# Patient Record
Sex: MALE | Race: WHITE | Employment: FULL TIME | URBAN - METROPOLITAN AREA
[De-identification: names, ages, dates, MRNs, and addresses within clinical notes are randomized per-mention and may not be internally consistent; named-entity substitution may affect disease eponyms.]

---

## 2017-05-08 ENCOUNTER — HOSPITAL ENCOUNTER (OUTPATIENT)
Dept: LAB | Age: 47
Discharge: HOME | End: 2017-05-08
Payer: SELF-PAY

## 2017-05-08 LAB
ALBUMIN SERPL BCP-MCNC: 4.3 G/DL (ref 3.4–5)
ALBUMIN/GLOB SERPL: 1.2 {RATIO} (ref 0.8–1.7)
ALP SERPL-CCNC: 63 U/L (ref 45–117)
ALT SERPL-CCNC: 43 U/L (ref 16–61)
AST SERPL W P-5'-P-CCNC: 34 U/L (ref 15–37)
BILIRUB DIRECT SERPL-MCNC: <0.1 MG/DL (ref 0–0.2)
BILIRUB SERPL-MCNC: 0.4 MG/DL (ref 0.2–1)
CHOLEST SERPL-MCNC: 330 MG/DL
GLOBULIN SER CALC-MCNC: 3.7 G/DL (ref 2–4)
HDLC SERPL-MCNC: 40 MG/DL (ref 40–60)
HDLC SERPL: 8.3 {RATIO} (ref 0–5)
LDLC SERPL CALC-MCNC: ABNORMAL MG/DL (ref 0–100)
LIPID PROFILE,FLP: ABNORMAL
PROT SERPL-MCNC: 8 G/DL (ref 6.4–8.2)
TRIGL SERPL-MCNC: 1133 MG/DL (ref ?–150)
VLDLC SERPL CALC-MCNC: ABNORMAL MG/DL

## 2017-05-08 PROCEDURE — 80061 LIPID PANEL: CPT | Performed by: INTERNAL MEDICINE

## 2017-05-08 PROCEDURE — 80076 HEPATIC FUNCTION PANEL: CPT | Performed by: INTERNAL MEDICINE

## 2017-05-08 PROCEDURE — 36415 COLL VENOUS BLD VENIPUNCTURE: CPT | Performed by: INTERNAL MEDICINE

## 2017-05-08 RX ORDER — OMEGA-3-ACID ETHYL ESTERS 1 G/1
2 CAPSULE, LIQUID FILLED ORAL 2 TIMES DAILY
COMMUNITY
End: 2017-05-08 | Stop reason: SDUPTHER

## 2017-05-08 NOTE — PROGRESS NOTES
Please contact patient and do the following asap  Add Lovaza 4 g a day. If not covered by insurance, and fish oil capsules 4 a day  Get fasting Lipid profile and LFTs in 6 wks. Please reinforce diet and exercise.

## 2017-05-09 RX ORDER — OMEGA-3-ACID ETHYL ESTERS 1 G/1
2 CAPSULE, LIQUID FILLED ORAL 2 TIMES DAILY
Qty: 60 CAP | Refills: 6 | Status: SHIPPED | OUTPATIENT
Start: 2017-05-09 | End: 2017-05-10

## 2017-05-10 ENCOUNTER — OFFICE VISIT (OUTPATIENT)
Dept: CARDIOLOGY CLINIC | Age: 47
End: 2017-05-10

## 2017-05-10 VITALS
BODY MASS INDEX: 29.51 KG/M2 | DIASTOLIC BLOOD PRESSURE: 88 MMHG | WEIGHT: 188 LBS | SYSTOLIC BLOOD PRESSURE: 115 MMHG | HEIGHT: 67 IN | HEART RATE: 80 BPM

## 2017-05-10 DIAGNOSIS — E78.5 HYPERLIPIDEMIA, UNSPECIFIED HYPERLIPIDEMIA TYPE: ICD-10-CM

## 2017-05-10 DIAGNOSIS — E78.1 PURE HYPERGLYCERIDEMIA: ICD-10-CM

## 2017-05-10 DIAGNOSIS — F10.10 ALCOHOL ABUSE: ICD-10-CM

## 2017-05-10 DIAGNOSIS — I10 ESSENTIAL HYPERTENSION WITH GOAL BLOOD PRESSURE LESS THAN 130/80: ICD-10-CM

## 2017-05-10 DIAGNOSIS — I45.2 RBBB WITH LEFT ANTERIOR FASCICULAR BLOCK: Primary | ICD-10-CM

## 2017-05-10 RX ORDER — LISINOPRIL 40 MG/1
40 TABLET ORAL DAILY
Qty: 90 TAB | Refills: 3 | Status: SHIPPED | OUTPATIENT
Start: 2017-05-10 | End: 2018-05-18 | Stop reason: SDUPTHER

## 2017-05-10 NOTE — PROGRESS NOTES
HISTORY OF PRESENT ILLNESS  Denny Lewis is a 51 y.o. male. HPI Comments: 5/17 d/nkechi fenofibrate dur to rectal odor without incontinence  11/16 thinks that he is getting mild bronchitis for last few days- improving    Hypertension   The history is provided by the medical records. Associated symptoms include shortness of breath. Pertinent negatives include no chest pain and no headaches. Cholesterol Problem   The history is provided by the medical records. Associated symptoms include shortness of breath. Pertinent negatives include no chest pain and no headaches. Shortness of Breath   The history is provided by the patient. This is a new problem. The problem occurs intermittently. The current episode started more than 1 week ago. The problem has not changed since onset. Pertinent negatives include no fever, no headaches, no cough, no wheezing, no PND, no orthopnea, no chest pain, no vomiting, no rash, no leg swelling and no claudication. The problem's precipitants include exercise (working in hot weather; carrying heavy stuff). Review of Systems   Constitutional: Negative for chills, diaphoresis, fever, malaise/fatigue and weight loss. HENT: Negative for nosebleeds. Eyes: Negative for discharge. Respiratory: Positive for shortness of breath. Negative for cough and wheezing. Cardiovascular: Negative for chest pain, palpitations, orthopnea, claudication, leg swelling and PND. Gastrointestinal: Negative for diarrhea, nausea and vomiting. Genitourinary: Negative for dysuria and hematuria. Musculoskeletal: Negative for joint pain. Skin: Negative for rash. Neurological: Negative for dizziness, seizures, loss of consciousness and headaches. Endo/Heme/Allergies: Negative for polydipsia. Does not bruise/bleed easily. Psychiatric/Behavioral: Negative for depression and substance abuse. The patient does not have insomnia.       Allergies   Allergen Reactions    Pcn [Penicillins] Rash Past Medical History:   Diagnosis Date    Essential hypertension with goal blood pressure less than 130/80 7/19/2016    Hyperlipemia 8/1/2016       Family History   Problem Relation Age of Onset    Heart Attack Neg Hx     Stroke Neg Hx        Social History   Substance Use Topics    Smoking status: Former Smoker     Types: Pipe     Quit date: 5/25/2002    Smokeless tobacco: Never Used    Alcohol use No      Comment: quit 8/16        Current Outpatient Prescriptions   Medication Sig    lisinopril (PRINIVIL, ZESTRIL) 40 mg tablet Take 1 Tab by mouth daily. No current facility-administered medications for this visit. History reviewed. No pertinent surgical history. Diagnostic Studies: Old records reviewed and show as follows  EKG tracings reviewed by me today. CARDIOLOGY STUDIES 5/12/2016   EKG Result SR, LAHB/RBBB   6/16 ECHO  SUMMARY:  Left ventricle: Systolic function was normal. Ejection fraction was  estimated in the range of 55 % to 60 %. There were no regional wall motion  abnormalities. Wall thickness was mildly increased. Right ventricle: The ventricle was mildly dilated. Mitral valve: There was trivial regurgitation. Tricuspid valve: Tricuspid regurgitation peak velocity: 2 m/sec. Pulmonary  artery systolic pressure: 19 mmHg. 6/16 TMT  Conclusion:    1. No ischemic ST-T changes at 100% of predicted maximal heart rate.     2. Normal functional capacity.    3. Appropriate heart rate and moderately hypertensive blood pressure response with baseline hypertension.     4. The patient will require medications for hypertension and Lisinopril 10 mg a day was given today.  Follow up BMP.  Side effects were explained including cough.     5. Clinical correlation is suggested    Visit Vitals    /88    Pulse 80    Ht 5' 7\" (1.702 m)    Wt 85.3 kg (188 lb)    BMI 29.44 kg/m2       Mr. Zenia Santiago has a reminder for a \"due or due soon\" health maintenance.  I have asked that he contact his primary care provider for follow-up on this health maintenance. Physical Exam   Constitutional: He is oriented to person, place, and time. He appears well-developed and well-nourished. No distress. HENT:   Head: Normocephalic and atraumatic. Mouth/Throat: Normal dentition. Eyes: Right eye exhibits no discharge. Left eye exhibits no discharge. No scleral icterus. Neck: Neck supple. No JVD present. Carotid bruit is not present. No thyromegaly present. Cardiovascular: Normal rate, regular rhythm, S1 normal, S2 normal, normal heart sounds and intact distal pulses. Exam reveals no gallop and no friction rub. No murmur heard. Pulmonary/Chest: Effort normal and breath sounds normal. He has no wheezes. He has no rales. Abdominal: Soft. He exhibits no mass. There is no tenderness. Musculoskeletal: He exhibits no edema. Lymphadenopathy:        Right cervical: No superficial cervical adenopathy present. Left cervical: No superficial cervical adenopathy present. Neurological: He is alert and oriented to person, place, and time. Skin: Skin is warm and dry. No rash noted. Psychiatric: He has a normal mood and affect. His behavior is normal.       ASSESSMENT and PLAN            Nicolas Sykes was seen today for hypertension and cholesterol problem. Diagnoses and all orders for this visit:    RBBB with left anterior fascicular block  Comments:  6/16 no symptoms      Essential hypertension with goal blood pressure less than 130/80  Comments:  11/16 controlled    Orders:  -     lisinopril (PRINIVIL, ZESTRIL) 40 mg tablet; Take 1 Tab by mouth daily.     Pure hyperglyceridemia  Comments:  5/17 TG 1133; 8/16 [id number]; HDL 39;  retry fenofibrate    Alcohol abuse  Comments:  5/17; 7/16 explained again;  6/16 Patient advised to take alcohol in moderation to avoid future cardiac and liver problems including arrhythmias, cardiomyopathy    Hyperlipidemia, unspecified hyperlipidemia type  - Fenofibrate Nanocrystallized 160 mg tab; Take 160 mg by mouth daily.  -     LIPID PANEL; Future  -     HEPATIC FUNCTION PANEL; Future        Pertinent laboratory and test data reviewed and discussed with patient.   See patient instructions also for other medical advice given    Medications Discontinued During This Encounter   Medication Reason    fenofibrate nanocrystallized 160 mg tab Side Effects    omega-3 acid ethyl esters (LOVAZA) 1 gram capsule Not A Current Medication    lisinopril (PRINIVIL, ZESTRIL) 40 mg tablet Reorder       Follow-up Disposition:  Return in about 6 months (around 11/10/2017), or if symptoms worsen or fail to improve, for post test.

## 2017-05-10 NOTE — PATIENT INSTRUCTIONS
Medications Discontinued During This Encounter   Medication Reason    fenofibrate nanocrystallized 160 mg tab Side Effects    omega-3 acid ethyl esters (LOVAZA) 1 gram capsule Not A Current Medication    lisinopril (PRINIVIL, ZESTRIL) 40 mg tablet Reorder       Orders Placed This Encounter    LIPID PANEL     Standing Status:   Future     Standing Expiration Date:   11/10/2017    HEPATIC FUNCTION PANEL     Standing Status:   Future     Standing Expiration Date:   11/10/2017    lisinopril (PRINIVIL, ZESTRIL) 40 mg tablet     Sig: Take 1 Tab by mouth daily. Dispense:  90 Tab     Refill:  3    Fenofibrate Nanocrystallized 160 mg tab     Sig: Take 160 mg by mouth daily. Dispense:  30 Tab     Refill:  3          Learning About Alcohol Misuse  What is alcohol misuse? Alcohol misuse means drinking so much that it causes problems for you or others. Early problems with alcohol can start at home. You may argue with loved ones about how much you're drinking. Your job may be affected because of drinking. You may drink when it's dangerous or illegal, such as when you drive. Drinking too much for a long time can lead to health conditions like high blood pressure and liver problems. What are the symptoms? Symptoms of alcohol misuse may include:  · Drinking much more than you planned. · Drinking even though it's causing problems for you or others. · Putting yourself in situations where you might get hurt. · Wanting to cut down or stop drinking, but not being able to. · Feeling guilty about how much you're drinking. How is alcohol misuse treated? Getting help for problems with alcohol is up to you. But you don't have to do it alone. There are many people and kinds of treatments to help with alcohol problems. Talking to your doctor is the first step. When you get a doctor's help, treatment for alcohol problems can be safer and quicker. Treatment options can include:  · Treatment programs.  Examples are group therapy, one or more types of counseling, and alcohol education. · Medicines. A doctor or counselor can help you know what kinds of medicines might help with cravings. · Free social support groups. These groups include AA (Alcoholics Anonymous) and SMART (Self-Management and Recovery Training). Your doctor can help you decide which type of program is best for you. Follow-up care is a key part of your treatment and safety. Be sure to make and go to all appointments, and call your doctor if you are having problems. It's also a good idea to know your test results and keep a list of the medicines you take. Where can you learn more? Go to http://baltaBrightFarmskevin.info/. Enter [phone] in the search box to learn more about \"Learning About Alcohol Misuse. \"  Current as of: January 3, 2017  Content Version: 11.2  © 7159-3571 [employer]. Care instructions adapted under license by [employer] (which disclaims liability or warranty for this information). If you have questions about a medical condition or this instruction, always ask your healthcare professional. [address] any warranty or liability for your use of this information.

## 2017-05-10 NOTE — PROGRESS NOTES
1. Have you been to the ER, urgent care clinic since your last visit? Hospitalized since your last visit?    no    2. Have you seen or consulted any other health care providers outside of the [address] [place] [address] since your last visit? Include any pap smears or colon screening. no    3. Since your last visit, have you had any of the following symptoms? Sob with activity palpitations every now then          4. Have you had any blood work, X-rays or cardiac testing? Yes, [facility]             5.  Where do you normally have your labs drawn?   [facility]    6. Do you need any refills today?    yes

## 2017-05-10 NOTE — MR AVS SNAPSHOT
Visit Information Date & Time Provider Department Dept. Phone Encounter #  
 5/10/2017 10:15 AM Austin Lugo MD [facility] [address] [place] [address] [id number] Follow-up Instructions Return in about 6 months (around 11/10/2017), or if symptoms worsen or fail to improve, for post test. Upcoming Health Maintenance Date Due Pneumococcal 19-64 Medium Risk (1 of 1 - PPSV23) 9/4/1988 DTaP/Tdap/Td series (1 - Tdap) 9/4/1990 INFLUENZA AGE 9 TO ADULT 8/1/2017 Allergies as of 5/10/2017  Review Complete On: 5/10/2017 By: Austin Lugo MD  
  
 Severity Noted Reaction Type Reactions Pcn [Penicillins]  07/22/2013   Not Verified Rash Current Immunizations  Never Reviewed No immunizations on file. Not reviewed this visit You Were Diagnosed With   
  
 Codes Comments RBBB with left anterior fascicular block    -  Primary ICD-10-CM: I45.2 ICD-9-CM: 426.52 6/16 no symptoms Essential hypertension with goal blood pressure less than 130/80     ICD-10-CM: I10 
ICD-9-CM: 401.9 11/16 controlled Pure hyperglyceridemia     ICD-10-CM: E78.1 ICD-9-CM: 272.1 5/17 TG 1133; 8/16 [id number]; HDL 39; 
retry fenofibrate Alcohol abuse     ICD-10-CM: F10.10 ICD-9-CM: 305.00 5/17; 7/16 explained again; 
6/16 Patient advised to take alcohol in moderation to avoid future cardiac and liver problems including arrhythmias, cardiomyopathy Hyperlipidemia, unspecified hyperlipidemia type     ICD-10-CM: E78.5 ICD-9-CM: 272.4 Vitals BP Pulse Height(growth percentile) Weight(growth percentile) BMI Smoking Status 115/88 80 5' 7\" (1.702 m) 188 lb (85.3 kg) 29.44 kg/m2 Former Smoker Vitals History BMI and BSA Data Body Mass Index Body Surface Area  
 29.44 kg/m 2 2.01 m 2 Preferred Pharmacy Pharmacy Name Phone [facility] [address], [address] [phone] Your Updated Medication List  
  
 This list is accurate as of: 5/10/17 11:00 AM.  Always use your most recent med list.  
  
  
  
  
 Fenofibrate Nanocrystallized 160 mg Tab Take 160 mg by mouth daily. lisinopril 40 mg tablet Commonly known as:  Tomas Apo Take 1 Tab by mouth daily. Prescriptions Sent to Pharmacy Refills  
 lisinopril (PRINIVIL, ZESTRIL) 40 mg tablet 3 Sig: Take 1 Tab by mouth daily. Class: Normal  
 Pharmacy: [facility] [address], [address] [place] [address] Ph #: [phone] Route: Oral  
 Fenofibrate Nanocrystallized 160 mg tab 3 Sig: Take 160 mg by mouth daily. Class: Normal  
 Pharmacy: [facility] [address], [address] [place] [address] Ph #: [phone] Route: Oral  
  
Follow-up Instructions Return in about 6 months (around 11/10/2017), or if symptoms worsen or fail to improve, for post test.  
  
To-Do List   
 Around 06/14/2017 Lab:  HEPATIC FUNCTION PANEL Around 06/14/2017 Lab:  LIPID PANEL Patient Instructions Medications Discontinued During This Encounter Medication Reason  fenofibrate nanocrystallized 160 mg tab Side Effects  omega-3 acid ethyl esters (LOVAZA) 1 gram capsule Not A Current Medication  lisinopril (PRINIVIL, ZESTRIL) 40 mg tablet Reorder Orders Placed This Encounter  LIPID PANEL Standing Status:   Future Standing Expiration Date:   11/10/2017  
 HEPATIC FUNCTION PANEL Standing Status:   Future Standing Expiration Date:   11/10/2017  lisinopril (PRINIVIL, ZESTRIL) 40 mg tablet Sig: Take 1 Tab by mouth daily. Dispense:  90 Tab Refill:  3  Fenofibrate Nanocrystallized 160 mg tab Sig: Take 160 mg by mouth daily. Dispense:  30 Tab Refill:  3 Learning About Alcohol Misuse What is alcohol misuse? Alcohol misuse means drinking so much that it causes problems for you or others. Early problems with alcohol can start at home. You may argue with loved ones about how much you're drinking. Your job may be affected because of drinking. You may drink when it's dangerous or illegal, such as when you drive. Drinking too much for a long time can lead to health conditions like high blood pressure and liver problems. What are the symptoms? Symptoms of alcohol misuse may include: · Drinking much more than you planned. · Drinking even though it's causing problems for you or others. · Putting yourself in situations where you might get hurt. · Wanting to cut down or stop drinking, but not being able to. · Feeling guilty about how much you're drinking. How is alcohol misuse treated? Getting help for problems with alcohol is up to you. But you don't have to do it alone. There are many people and kinds of treatments to help with alcohol problems. Talking to your doctor is the first step. When you get a doctor's help, treatment for alcohol problems can be safer and quicker. Treatment options can include: · Treatment programs. Examples are group therapy, one or more types of counseling, and alcohol education. · Medicines. A doctor or counselor can help you know what kinds of medicines might help with cravings. · Free social support groups. These groups include AA (Alcoholics Anonymous) and SMART (Self-Management and Recovery Training). Your doctor can help you decide which type of program is best for you. Follow-up care is a key part of your treatment and safety. Be sure to make and go to all appointments, and call your doctor if you are having problems. It's also a good idea to know your test results and keep a list of the medicines you take. Where can you learn more? Go to http://balta-kevin.info/. Enter [phone] in the search box to learn more about \"Learning About Alcohol Misuse. \" Current as of: January 3, 2017 Content Version: 11.2 © 6122-1941 [employer]. Care instructions adapted under license by [employer] (which disclaims liability or warranty for this information). If you have questions about a medical condition or this instruction, always ask your healthcare professional. [address] any warranty or liability for your use of this information. Introducing [facility]! Carol Douglas introduces [employer] patient portal. Now you can access parts of your medical record, email your doctor's office, and request medication refills online. 1. In your internet browser, go to [email or web address] 2. Click on the First Time User? Click Here link in the Sign In box. You will see the New Member Sign Up page. 3. Enter your [employer] [place] Access Code exactly as it appears below. You will not need to use this code after youve completed the sign-up process. If you do not sign up before the expiration date, you must request a new code. · [employer] Access Code: [email or web address] Expires: 8/8/2017 10:05 AM 
 
4. Enter the last four digits of your Social Security Number (xxxx) and Date of Birth (mm/dd/yyyy) as indicated and click Submit. You will be taken to the next sign-up page. 5. Create a [employer] [place] ID. This will be your [employer] login ID and cannot be changed, so think of one that is secure and easy to remember. 6. Create a [employer] [place] password. You can change your password at any time. 7. Enter your Password Reset Question and Answer. This can be used at a later time if you forget your password. 8. Enter your e-mail address. You will receive e-mail notification when new information is available in [address] 9. Click Sign Up. You can now view and download portions of your medical record. 10. Click the Download Summary menu link to download a portable copy of your medical information.  
 
If you have questions, please visit the Frequently Asked Questions section of the [employer]. Remember, Linkedwitht is NOT to be used for urgent needs. For medical emergencies, dial 911. Now available from your iPhone and Android! Please provide this summary of care documentation to your next provider. Your primary care clinician is listed as NONE. If you have any questions after today's visit, please call [phone].

## 2018-03-15 ENCOUNTER — OFFICE VISIT (OUTPATIENT)
Dept: CARDIOLOGY CLINIC | Age: 48
End: 2018-03-15

## 2018-03-15 VITALS
SYSTOLIC BLOOD PRESSURE: 179 MMHG | WEIGHT: 189 LBS | BODY MASS INDEX: 29.66 KG/M2 | HEIGHT: 67 IN | HEART RATE: 59 BPM | DIASTOLIC BLOOD PRESSURE: 99 MMHG

## 2018-03-15 DIAGNOSIS — E78.5 HYPERLIPIDEMIA, UNSPECIFIED HYPERLIPIDEMIA TYPE: ICD-10-CM

## 2018-03-15 DIAGNOSIS — R06.02 SOB (SHORTNESS OF BREATH) ON EXERTION: ICD-10-CM

## 2018-03-15 DIAGNOSIS — I10 ESSENTIAL HYPERTENSION WITH GOAL BLOOD PRESSURE LESS THAN 130/80: ICD-10-CM

## 2018-03-15 DIAGNOSIS — F10.10 ALCOHOL ABUSE: ICD-10-CM

## 2018-03-15 DIAGNOSIS — I45.2 RBBB WITH LEFT ANTERIOR FASCICULAR BLOCK: Primary | ICD-10-CM

## 2018-03-15 DIAGNOSIS — E78.1 PURE HYPERGLYCERIDEMIA: ICD-10-CM

## 2018-03-15 RX ORDER — AMLODIPINE BESYLATE 5 MG/1
5 TABLET ORAL DAILY
Qty: 30 TAB | Refills: 3 | Status: SHIPPED | OUTPATIENT
Start: 2018-03-15 | End: 2018-07-15 | Stop reason: SDUPTHER

## 2018-03-15 NOTE — PATIENT INSTRUCTIONS
After the recommended changes have been made in blood pressure medicines, patient advised to keep BP/HR(pulse rate) chart twice daily and bring us results in next 2 weeks or so. Patient may send the results via \"My Chart\" if desired. Please rest for 5-10 minutes before checking blood pressure  Medications Discontinued During This Encounter   Medication Reason    Fenofibrate Nanocrystallized 160 mg tab Reorder       Orders Placed This Encounter    LIPID PANEL     Standing Status:   Future     Standing Expiration Date:   9/15/2018    HEPATIC FUNCTION PANEL     Standing Status:   Future     Standing Expiration Date:   9/15/2018    AMB POC EKG ROUTINE W/ 12 LEADS, INTER & REP     Order Specific Question:   Reason for Exam:     Answer:   routine testing    amLODIPine (NORVASC) 5 mg tablet     Sig: Take 1 Tab by mouth daily. Dispense:  30 Tab     Refill:  3    Fenofibrate Nanocrystallized 160 mg tab     Sig: Take 160 mg by mouth daily. Dispense:  30 Tab     Refill:  3          Learning About Alcohol Misuse  What is alcohol misuse? Alcohol misuse means drinking so much that it causes problems for you or others. Early problems with alcohol can start at home. You may argue with loved ones about how much you're drinking. Your job may be affected because of drinking. You may drink when it's dangerous or illegal, such as when you drive. Drinking too much for a long time can lead to health conditions like high blood pressure and liver problems. What are the symptoms? Symptoms of alcohol misuse may include:  · Drinking much more than you planned. · Drinking even though it's causing problems for you or others. · Putting yourself in situations where you might get hurt. · Wanting to cut down or stop drinking, but not being able to. · Feeling guilty about how much you're drinking. How is alcohol misuse treated? Getting help for problems with alcohol is up to you. But you don't have to do it alone.  There are many people and kinds of treatments to help with alcohol problems. Talking to your doctor is the first step. When you get a doctor's help, treatment for alcohol problems can be safer and quicker. Treatment options can include:  · Treatment programs. Examples are group therapy, one or more types of counseling, and alcohol education. · Medicines. A doctor or counselor can help you know what kinds of medicines might help with cravings. · Free social support groups. These groups include AA (Alcoholics Anonymous) and SMART (Self-Management and Recovery Training). Your doctor can help you decide which type of program is best for you. Follow-up care is a key part of your treatment and safety. Be sure to make and go to all appointments, and call your doctor if you are having problems. It's also a good idea to know your test results and keep a list of the medicines you take. Where can you learn more? Go to http://balta-kevin.info/. Enter [id number] in the search box to learn more about \"Learning About Alcohol Misuse. \"  Current as of: November 3, 2016  Content Version: 11.4  © 0232-4199 [employer]. Care instructions adapted under license by [employer] (which disclaims liability or warranty for this information). If you have questions about a medical condition or this instruction, always ask your healthcare professional. [address] any warranty or liability for your use of this information.

## 2018-03-15 NOTE — MR AVS SNAPSHOT
[address] [facility] 
 
 
 [address] [facility], [address] [facility] [address] 
[phone] Patient: Joyce Maxwell MRN: [id number] EHO:9/6/1833 Visit Information Date & Time Provider Department Dept. Phone Encounter #  
 3/15/2018  8:45 AM Vik Benitez MD [facility] [address] [place] [address] [id number] Follow-up Instructions Return in about 6 months (around 9/15/2018), or if symptoms worsen or fail to improve, for post test.  
  
Your Appointments 9/14/2018  1:15 PM  
Office Visit with Vik Benitez MD  
[facility]) Appt Note: 6 months post labs [address] [facility], [address] [facility] [address]  
[address] [facility], [address] [place] [address] Upcoming Health Maintenance Date Due Pneumococcal 19-64 Medium Risk (1 of 1 - PPSV23) 9/4/1988 DTaP/Tdap/Td series (1 - Tdap) 9/4/1990 Influenza Age 5 to Adult 8/1/2017 Allergies as of 3/15/2018  Review Complete On: 3/15/2018 By: Vik Benitez MD  
  
 Severity Noted Reaction Type Reactions Pcn [Penicillins]  07/22/2013   Not Verified Rash Current Immunizations  Never Reviewed No immunizations on file. Not reviewed this visit You Were Diagnosed With   
  
 Codes Comments RBBB with left anterior fascicular block    -  Primary ICD-10-CM: I45.2 ICD-9-CM: 426.52 3/18; 6/16 no symptoms Hyperlipidemia, unspecified hyperlipidemia type     ICD-10-CM: E78.5 ICD-9-CM: 272.4 Essential hypertension with goal blood pressure less than 130/80     ICD-10-CM: I10 
ICD-9-CM: 401.9 3/18 add norvasc; 11/16 controlled Pure hyperglyceridemia     ICD-10-CM: E78.1 ICD-9-CM: 272.1 3/18 restart fenofibrate; 5/17 TG 1133; 8/16 [id number]; HDL 39;  
  
 Alcohol abuse     ICD-10-CM: F10.10 ICD-9-CM: 305.00 3/18; 5/17; 7/16 explained again; 
6/16 Patient advised to take alcohol in moderation to avoid future cardiac and liver problems including arrhythmias, cardiomy SOB (shortness of breath) on exertion     ICD-10-CM: R06.02 
ICD-9-CM: 786.05 3/18 improving 
nl TMT Vitals BP Pulse Height(growth percentile) Weight(growth percentile) BMI Smoking Status (!) 179/99 (!) 59 5' 7\" (1.702 m) 189 lb (85.7 kg) 29.6 kg/m2 Former Smoker Vitals History BMI and BSA Data Body Mass Index Body Surface Area  
 29.6 kg/m 2 2.01 m 2 Preferred Pharmacy Pharmacy Name Phone [facility] [address], [address] [place], [place] [address] Road [phone] Your Updated Medication List  
  
   
This list is accurate as of 3/15/18  9:22 AM.  Always use your most recent med list. amLODIPine 5 mg tablet Commonly known as:  Amaya Whittington Take 1 Tab by mouth daily. Fenofibrate Nanocrystallized 160 mg Tab Take 160 mg by mouth daily. lisinopril 40 mg tablet Commonly known as:  Елена Mccarthy Take 1 Tab by mouth daily. Prescriptions Sent to Pharmacy Refills  
 amLODIPine (NORVASC) 5 mg tablet 3 Sig: Take 1 Tab by mouth daily. Class: Normal  
 Pharmacy: [facility] [address] [place] [address], [address] [place] [address] Road Ph #: [phone] Route: Oral  
 Fenofibrate Nanocrystallized 160 mg tab 3 Sig: Take 160 mg by mouth daily. Class: Normal  
 Pharmacy: [facility] [address], [address] [place] [address] Road Ph #: [phone] Route: Oral  
  
We Performed the Following AMB POC EKG ROUTINE W/ 12 LEADS, INTER & REP [49771 CPT(R)] Follow-up Instructions Return in about 6 months (around 9/15/2018), or if symptoms worsen or fail to improve, for post test.  
  
To-Do List   
 Around 04/19/2018 Lab:  HEPATIC FUNCTION PANEL Around 04/19/2018 Lab:  LIPID PANEL Patient Instructions After the recommended changes have been made in blood pressure medicines, patient advised to keep BP/HR(pulse rate) chart twice daily and bring us results in next 2 weeks or so. Patient may send the results via \"My Chart\" if desired. Please rest for 5-10 minutes before checking blood pressure Medications Discontinued During This Encounter Medication Reason  Fenofibrate Nanocrystallized 160 mg tab Reorder Orders Placed This Encounter  LIPID PANEL Standing Status:   Future Standing Expiration Date:   9/15/2018  
 HEPATIC FUNCTION PANEL Standing Status:   Future Standing Expiration Date:   9/15/2018  AMB POC EKG ROUTINE W/ 12 LEADS, INTER & REP Order Specific Question:   Reason for Exam: Answer:   routine testing  amLODIPine (NORVASC) 5 mg tablet Sig: Take 1 Tab by mouth daily. Dispense:  30 Tab Refill:  3  Fenofibrate Nanocrystallized 160 mg tab Sig: Take 160 mg by mouth daily. Dispense:  30 Tab Refill:  3 Learning About Alcohol Misuse What is alcohol misuse? Alcohol misuse means drinking so much that it causes problems for you or others. Early problems with alcohol can start at home. You may argue with loved ones about how much you're drinking. Your job may be affected because of drinking. You may drink when it's dangerous or illegal, such as when you drive. Drinking too much for a long time can lead to health conditions like high blood pressure and liver problems. What are the symptoms? Symptoms of alcohol misuse may include: · Drinking much more than you planned. · Drinking even though it's causing problems for you or others. · Putting yourself in situations where you might get hurt. · Wanting to cut down or stop drinking, but not being able to. · Feeling guilty about how much you're drinking. How is alcohol misuse treated? Getting help for problems with alcohol is up to you. But you don't have to do it alone. There are many people and kinds of treatments to help with alcohol problems. Talking to your doctor is the first step. When you get a doctor's help, treatment for alcohol problems can be safer and quicker. Treatment options can include: · Treatment programs. Examples are group therapy, one or more types of counseling, and alcohol education. · Medicines. A doctor or counselor can help you know what kinds of medicines might help with cravings. · Free social support groups. These groups include AA (Alcoholics Anonymous) and SMART (Self-Management and Recovery Training). Your doctor can help you decide which type of program is best for you. Follow-up care is a key part of your treatment and safety. Be sure to make and go to all appointments, and call your doctor if you are having problems. It's also a good idea to know your test results and keep a list of the medicines you take. Where can you learn more? Go to http://balta-kevin.info/. Enter [phone] in the search box to learn more about \"Learning About Alcohol Misuse. \" Current as of: November 3, 2016 Content Version: 11.4 © 6959-0360 Healthwise, Incorporated. Care instructions adapted under license by [employer] (which disclaims liability or warranty for this information). If you have questions about a medical condition or this instruction, always ask your healthcare professional. [address] any warranty or liability for your use of this information. Introducing [facility]! Bree Godwin introduces [employer] patient portal. Now you can access parts of your medical record, email your doctor's office, and request medication refills online. 1. In your internet browser, go to [email or web address] 2. Click on the First Time User? Click Here link in the Sign In box. You will see the New Member Sign Up page. 3. Enter your [employer] Access Code exactly as it appears below. You will not need to use this code after youve completed the sign-up process.  If you do not sign up before the expiration date, you must request a new code. · Slingr Access Code: [email or web address] Expires: 6/13/2018  8:42 AM 
 
4. Enter the last four digits of your Social Security Number (xxxx) and Date of Birth (mm/dd/yyyy) as indicated and click Submit. You will be taken to the next sign-up page. 5. Create a Slingr ID. This will be your Slingr login ID and cannot be changed, so think of one that is secure and easy to remember. 6. Create a Slingr password. You can change your password at any time. 7. Enter your Password Reset Question and Answer. This can be used at a later time if you forget your password. 8. Enter your e-mail address. You will receive e-mail notification when new information is available in [address] 9. Click Sign Up. You can now view and download portions of your medical record. 10. Click the Download Summary menu link to download a portable copy of your medical information. If you have questions, please visit the Frequently Asked Questions section of the [employer] website. Remember, [employer]r is NOT to be used for urgent needs. For medical emergencies, dial 911. Now available from your iPhone and Android! Please provide this summary of care documentation to your next provider. Your primary care clinician is listed as NONE. If you have any questions after today's visit, please call [phone].

## 2018-03-15 NOTE — PROGRESS NOTES
HISTORY OF PRESENT ILLNESS  Warren Figueredo is a 49 y.o. male. HPI Comments: 5/17 d/nkechi fenofibrate dur to rectal odor without incontinence  11/16 thinks that he is getting mild bronchitis for last few days- improving    Cholesterol Problem   The history is provided by the medical records. Associated symptoms include shortness of breath. Pertinent negatives include no chest pain and no headaches. Hypertension   The history is provided by the medical records. Associated symptoms include shortness of breath. Pertinent negatives include no chest pain and no headaches. Shortness of Breath   The history is provided by the patient. This is a new problem. The problem occurs intermittently. The current episode started more than 1 week ago. The problem has been gradually improving. Pertinent negatives include no fever, no headaches, no cough, no wheezing, no PND, no orthopnea, no chest pain, no vomiting, no rash, no leg swelling and no claudication. The problem's precipitants include exercise (working in hot weather; carrying heavy stuff). Review of Systems   Constitutional: Negative for chills, diaphoresis, fever, malaise/fatigue and weight loss. HENT: Negative for nosebleeds. Eyes: Negative for discharge. Respiratory: Positive for shortness of breath. Negative for cough and wheezing. Cardiovascular: Negative for chest pain, palpitations, orthopnea, claudication, leg swelling and PND. Gastrointestinal: Negative for diarrhea, nausea and vomiting. Genitourinary: Negative for dysuria and hematuria. Musculoskeletal: Negative for joint pain. Skin: Negative for rash. Neurological: Negative for dizziness, seizures, loss of consciousness and headaches. Endo/Heme/Allergies: Negative for polydipsia. Does not bruise/bleed easily. Psychiatric/Behavioral: Negative for depression and substance abuse. The patient does not have insomnia.       Allergies   Allergen Reactions    Pcn [Penicillins] Rash Past Medical History:   Diagnosis Date    Essential hypertension with goal blood pressure less than 130/80 7/19/2016    Hyperlipemia 8/1/2016       Family History   Problem Relation Age of Onset    Heart Attack Neg Hx     Stroke Neg Hx        Social History   Substance Use Topics    Smoking status: Former Smoker     Types: Pipe     Quit date: 5/25/2002    Smokeless tobacco: Never Used    Alcohol use 46.8 oz/week     36 Standard drinks or equivalent, 28 Shots of liquor, 14 Cans of beer per week      Comment: moderate use 2 beers per day and 4 mixed drinks         Current Outpatient Prescriptions   Medication Sig    lisinopril (PRINIVIL, ZESTRIL) 40 mg tablet Take 1 Tab by mouth daily.  Fenofibrate Nanocrystallized 160 mg tab Take 160 mg by mouth daily. (Patient not taking: Reported on 3/15/2018)     No current facility-administered medications for this visit. History reviewed. No pertinent surgical history. Diagnostic Studies: Old records reviewed and show as follows  EKG tracings reviewed by me today. CARDIOLOGY STUDIES 5/12/2016   EKG Result SR, LAHB/RBBB   Some recent data might be hidden   6/16 ECHO  SUMMARY:  Left ventricle: Systolic function was normal. Ejection fraction was  estimated in the range of 55 % to 60 %. There were no regional wall motion  abnormalities. Wall thickness was mildly increased. Right ventricle: The ventricle was mildly dilated. Mitral valve: There was trivial regurgitation. Tricuspid valve: Tricuspid regurgitation peak velocity: 2 m/sec. Pulmonary  artery systolic pressure: 19 mmHg. 6/16 TMT  Conclusion:    1. No ischemic ST-T changes at 100% of predicted maximal heart rate.     2. Normal functional capacity.    3. Appropriate heart rate and moderately hypertensive blood pressure response with baseline hypertension.     4.  The patient will require medications for hypertension and Lisinopril 10 mg a day was given today.  Follow up BMP.  Side effects were explained including cough.     5. Clinical correlation is suggested    Visit Vitals    BP (!) 179/99    Pulse (!) 59    Ht 5' 7\" (1.702 m)    Wt 189 lb (85.7 kg)    BMI 29.6 kg/m2       Mr. Carmen Blackburn has a reminder for a \"due or due soon\" health maintenance. I have asked that he contact his primary care provider for follow-up on this health maintenance. Physical Exam   Constitutional: He is oriented to person, place, and time. He appears well-developed and well-nourished. No distress. HENT:   Head: Normocephalic and atraumatic. Mouth/Throat: Normal dentition. Eyes: Right eye exhibits no discharge. Left eye exhibits no discharge. No scleral icterus. Neck: Neck supple. No JVD present. Carotid bruit is not present. No thyromegaly present. Cardiovascular: Normal rate, regular rhythm, S1 normal, S2 normal, normal heart sounds and intact distal pulses. Exam reveals no gallop and no friction rub. No murmur heard. Pulmonary/Chest: Effort normal and breath sounds normal. He has no wheezes. He has no rales. Abdominal: Soft. He exhibits no mass. There is no tenderness. Musculoskeletal: He exhibits no edema. Lymphadenopathy:        Right cervical: No superficial cervical adenopathy present. Left cervical: No superficial cervical adenopathy present. Neurological: He is alert and oriented to person, place, and time. Skin: Skin is warm and dry. No rash noted. Psychiatric: He has a normal mood and affect. His behavior is normal.       ASSESSMENT and PLAN          Diagnoses and all orders for this visit:    1. RBBB with left anterior fascicular block  Comments:  3/18; 6/16 no symptoms    Orders:  -     AMB POC EKG ROUTINE W/ 12 LEADS, INTER & REP    2. Hyperlipidemia, unspecified hyperlipidemia type  -     Fenofibrate Nanocrystallized 160 mg tab; Take 160 mg by mouth daily.     3. Essential hypertension with goal blood pressure less than 130/80  Comments:  3/18 add norvasc; 11/16 controlled    Orders:  -     amLODIPine (NORVASC) 5 mg tablet; Take 1 Tab by mouth daily. 4. Pure hyperglyceridemia  Comments:  3/18 restart fenofibrate; 5/17 TG 1133; 8/16 [id number]; HDL 39;     Orders:  -     LIPID PANEL; Future  -     HEPATIC FUNCTION PANEL; Future    5. Alcohol abuse  Comments:  3/18; 5/17; 7/16 explained again;  6/16 Patient advised to take alcohol in moderation to avoid future cardiac and liver problems including arrhythmias, cardiomy    6. SOB (shortness of breath) on exertion  Comments:  3/18 improving  nl TMT        Pertinent laboratory and test data reviewed and discussed with patient.   See patient instructions also for other medical advice given    Medications Discontinued During This Encounter   Medication Reason    Fenofibrate Nanocrystallized 160 mg tab Reorder       Follow-up Disposition:  Return in about 6 months (around 9/15/2018), or if symptoms worsen or fail to improve, for post test.

## 2018-03-15 NOTE — PROGRESS NOTES
1. Have you been to the ER, urgent care clinic since your last visit? Hospitalized since your last visit? No     2. Have you seen or consulted any other health care providers outside of the [facility]\"" since your last visit? Include any pap smears or colon screening. no     3. Since your last visit, have you had any of the following symptoms? No symptoms reported by patient    4. Have you had any blood work, X-rays or cardiac testing?     None     Discussed meds verbally with patient

## 2018-05-18 DIAGNOSIS — I10 ESSENTIAL HYPERTENSION WITH GOAL BLOOD PRESSURE LESS THAN 130/80: ICD-10-CM

## 2018-05-18 RX ORDER — LISINOPRIL 40 MG/1
TABLET ORAL
Qty: 45 TAB | Refills: 2 | Status: SHIPPED | OUTPATIENT
Start: 2018-05-18 | End: 2018-10-01 | Stop reason: SDUPTHER

## 2018-07-15 DIAGNOSIS — E78.5 HYPERLIPIDEMIA, UNSPECIFIED HYPERLIPIDEMIA TYPE: ICD-10-CM

## 2018-07-15 DIAGNOSIS — I10 ESSENTIAL HYPERTENSION WITH GOAL BLOOD PRESSURE LESS THAN 130/80: ICD-10-CM

## 2018-07-16 RX ORDER — AMLODIPINE BESYLATE 5 MG/1
TABLET ORAL
Qty: 30 TAB | Refills: 2 | Status: SHIPPED | OUTPATIENT
Start: 2018-07-16 | End: 2018-10-17 | Stop reason: SDUPTHER

## 2018-07-16 RX ORDER — FENOFIBRATE 160 MG/1
TABLET ORAL
Qty: 30 TAB | Refills: 2 | Status: SHIPPED | OUTPATIENT
Start: 2018-07-16 | End: 2018-10-17 | Stop reason: SDUPTHER

## 2018-10-01 DIAGNOSIS — I10 ESSENTIAL HYPERTENSION WITH GOAL BLOOD PRESSURE LESS THAN 130/80: ICD-10-CM

## 2018-10-01 RX ORDER — LISINOPRIL 40 MG/1
TABLET ORAL
Qty: 45 TAB | Refills: 1 | Status: SHIPPED | OUTPATIENT
Start: 2018-10-01 | End: 2018-12-20 | Stop reason: SDUPTHER

## 2018-10-12 ENCOUNTER — OFFICE VISIT (OUTPATIENT)
Dept: CARDIOLOGY CLINIC | Age: 48
End: 2018-10-12

## 2018-10-12 VITALS
DIASTOLIC BLOOD PRESSURE: 78 MMHG | BODY MASS INDEX: 29.35 KG/M2 | WEIGHT: 187 LBS | HEART RATE: 67 BPM | HEIGHT: 67 IN | SYSTOLIC BLOOD PRESSURE: 125 MMHG

## 2018-10-12 DIAGNOSIS — E78.00 PURE HYPERCHOLESTEROLEMIA: ICD-10-CM

## 2018-10-12 DIAGNOSIS — I10 ESSENTIAL HYPERTENSION WITH GOAL BLOOD PRESSURE LESS THAN 130/80: ICD-10-CM

## 2018-10-12 DIAGNOSIS — I45.2 RBBB WITH LEFT ANTERIOR FASCICULAR BLOCK: Primary | ICD-10-CM

## 2018-10-12 DIAGNOSIS — F10.10 ALCOHOL ABUSE: ICD-10-CM

## 2018-10-12 NOTE — MR AVS SNAPSHOT
[address] [facility] [place] 
 
 
 [address] [facility], [address] [facility] [address] 
[phone] Patient: Shantelle Quiroz MRN: [id number] XIT:[id number]/[id number] Visit Information Date & Time Provider Department Dept. Phone Encounter #  
 10/12/2018  8:00 AM Yovani Willson MD [facility] [address] [place] [address] [id number] Follow-up Instructions Return in about 6 months (around 4/12/2019), or if symptoms worsen or fail to improve. Your Appointments 4/11/2019  8:15 AM  
Office Visit with Yovani Willson MD  
[facility]) Appt Note: 6 months post BMP,Lipid, [facility], [address] [facility] [address]  
[address] [facility], [address] Upcoming Health Maintenance Date Due Pneumococcal 19-64 Medium Risk (1 of 1 - PPSV23) 9/4/1988 DTaP/Tdap/Td series (1 - Tdap) 9/4/1990 Influenza Age 5 to Adult 8/1/2018 Allergies as of 10/12/2018  Review Complete On: 10/12/2018 By: Armando Faulkner NP Severity Noted Reaction Type Reactions Pcn [Penicillins]  07/22/2013   Not Verified Rash Current Immunizations  Never Reviewed No immunizations on file. Not reviewed this visit You Were Diagnosed With   
  
 Codes Comments RBBB with left anterior fascicular block    -  Primary ICD-10-CM: I45.2 ICD-9-CM: 426.52 3/18; 6/16 no symptoms Essential hypertension with goal blood pressure less than 130/80     ICD-10-CM: I10 
ICD-9-CM: 401.9 10/18 controlled/3/18 add norvasc; 11/16 controlled Pure hypercholesterolemia     ICD-10-CM: E78.00 ICD-9-CM: 272.0 Lipds ordered. 3/18 restart fenofibrate; 5/17 TG 1133; 8/16 [id number]; HDL 3 Alcohol abuse     ICD-10-CM: F10.10 ICD-9-CM: 305.00 3/18; 5/17; 7/16 explained again; 
6/16 Patient advised to take alcohol in moderation to avoid future cardiac and liver problems including arrhythmias, cardiomy Vitals BP Pulse Height(growth percentile) Weight(growth percentile) BMI Smoking Status 125/78 67 5' 7\" (1.702 m) 187 lb (84.8 kg) 29.29 kg/m2 Former Smoker Vitals History BMI and BSA Data Body Mass Index Body Surface Area  
 29.29 kg/m 2 2 m 2 Preferred Pharmacy Pharmacy Name Phone [facility], [address] [phone] Your Updated Medication List  
  
   
This list is accurate as of 10/12/18  8:52 AM.  Always use your most recent med list. amLODIPine 5 mg tablet Commonly known as:  [place] TAKE ONE TABLET BY MOUTH DAILY  
  
 fenofibrate 160 mg tablet Commonly known as:  LOFIBRA TAKE ONE TABLET BY MOUTH DAILY  
  
 lisinopril 40 mg tablet Commonly known as:  PRINIVIL, ZESTRIL  
TAKE ONE TABLET BY MOUTH DAILY Follow-up Instructions Return in about 6 months (around 4/12/2019), or if symptoms worsen or fail to improve. To-Do List   
 10/12/2018 Lab:  LIPID PANEL   
  
 10/17/2018 Lab:  HEPATIC FUNCTION PANEL   
  
 10/18/2018 Lab:  METABOLIC PANEL, COMPREHENSIVE Patient Instructions Have fasting blood work drawn - will call with results Continue current medications Low sodium diet Heart-Healthy Diet: Care Instructions Your Care Instructions A heart-healthy diet has lots of vegetables, fruits, nuts, beans, and whole grains, and is low in salt. It limits foods that are high in saturated fat, such as meats, cheeses, and fried foods. It may be hard to change your diet, but even small changes can lower your risk of heart attack and heart disease. Follow-up care is a key part of your treatment and safety. Be sure to make and go to all appointments, and call your doctor if you are having problems. It's also a good idea to know your test results and keep a list of the medicines you take. How can you care for yourself at home? Watch your portions · Learn what a serving is. A \"serving\" and a \"portion\" are not always the same thing. Make sure that you are not eating larger portions than are recommended. For example, a serving of pasta is ½ cup. A serving size of meat is 2 to 3 ounces. A 3-ounce serving is about the size of a deck of cards. Measure serving sizes until you are good at [place]" them. Keep in mind that restaurants often serve portions that are 2 or 3 times the size of one serving. · To keep your energy level up and keep you from feeling hungry, eat often but in smaller portions. · Eat only the number of calories you need to stay at a healthy weight. If you need to lose weight, eat fewer calories than your body burns (through exercise and other physical activity). Eat more fruits and vegetables · Eat a variety of fruit and vegetables every day. Dark green, deep orange, red, or yellow fruits and vegetables are especially good for you. Examples include spinach, carrots, peaches, and berries. · Keep carrots, celery, and other veggies handy for snacks. Buy fruit that is in season and store it where you can see it so that you will be tempted to eat it. · Cook dishes that have a lot of veggies in them, such as stir-fries and soups. Limit saturated and trans fat · Read food labels, and try to avoid saturated and trans fats. They increase your risk of heart disease. Trans fat is found in many processed foods such as cookies and crackers. · Use olive or canola oil when you cook. Try cholesterol-lowering spreads, such as Benecol or Take Control. · Bake, broil, grill, or steam foods instead of frying them. · Choose lean meats instead of high-fat meats such as hot dogs and sausages. Cut off all visible fat when you prepare meat. · Eat fish, skinless poultry, and meat alternatives such as soy products instead of high-fat meats. Soy products, such as tofu, may be especially good for your heart. · Choose low-fat or fat-free milk and dairy products. Eat fish · Eat at least two servings of fish a week. Certain fish, such as salmon and tuna, contain omega-3 fatty acids, which may help reduce your risk of heart attack. Eat foods high in fiber · Eat a variety of grain products every day. Include whole-grain foods that have lots of fiber and nutrients. Examples of whole-grain foods include oats, whole wheat bread, and brown rice. · Buy whole-grain breads and cereals, instead of white bread or pastries. Limit salt and sodium · Limit how much salt and sodium you eat to help lower your blood pressure. · Taste food before you salt it. Add only a little salt when you think you need it. With time, your taste buds will adjust to less salt. · Eat fewer snack items, fast foods, and other high-salt, processed foods. Check food labels for the amount of sodium in packaged foods. · Choose low-sodium versions of canned goods (such as soups, vegetables, and beans). Limit sugar · Limit drinks and foods with added sugar. These include candy, desserts, and soda pop. Limit alcohol · Limit alcohol to no more than 2 drinks a day for men and 1 drink a day for women. Too much alcohol can cause health problems. When should you call for help? Watch closely for changes in your health, and be sure to contact your doctor if: 
  · You would like help planning heart-healthy meals. Where can you learn more? Go to http://balta-kevin.info/. Enter V137 in the search box to learn more about \"Heart-Healthy Diet: Care Instructions. \" Current as of: December 6, 2017 Content Version: 11.8 © 2249-2248 [employer]. Care instructions adapted under license by [employer] (which disclaims liability or warranty for this information).  If you have questions about a medical condition or this instruction, always ask your healthcare professional. [employer] disclaims any warranty or liability for your use of this information. Introducing [facility] & HEALTH SERVICES! [employer] introduces DivvyHQ patient portal. Now you can access parts of your medical record, email your doctor's office, and request medication refills online. 1. In your internet browser, go to [email or web address] 2. Click on the First Time User? Click Here link in the Sign In box. You will see the New Member Sign Up page. 3. Enter your DivvyHQ Access Code exactly as it appears below. You will not need to use this code after youve completed the sign-up process. If you do not sign up before the expiration date, you must request a new code. · DivvyHQ Access Code: [email or web address] Expires: 1/10/2019  7:47 AM 
 
4. Enter the last four digits of your Social Security Number (xxxx) and Date of Birth (mm/dd/yyyy) as indicated and click Submit. You will be taken to the next sign-up page. 5. Create a DivvyHQ ID. This will be your DivvyHQ login ID and cannot be changed, so think of one that is secure and easy to remember. 6. Create a DivvyHQ password. You can change your password at any time. 7. Enter your Password Reset Question and Answer. This can be used at a later time if you forget your password. 8. Enter your e-mail address. You will receive e-mail notification when new information is available in [address] 9. Click Sign Up. You can now view and download portions of your medical record. 10. Click the Download Summary menu link to download a portable copy of your medical information. If you have questions, please visit the Frequently Asked Questions section of the DivvyHQ website. Remember, DivvyHQ is NOT to be used for urgent needs. For medical emergencies, dial 911. Now available from your iPhone and Android! Please provide this summary of care documentation to your next provider. Your primary care clinician is listed as NONE. If you have any questions after today's visit, please call [phone].

## 2018-10-12 NOTE — PROGRESS NOTES
HISTORY OF PRESENT ILLNESS  Shantelle Quiroz is a 51 y.o. male. HPI Comments: 5/17 d/nkechi fenofibrate dur to rectal odor without incontinence  11/16 thinks that he is getting mild bronchitis for last few days- improving  10/18 - Doing well, no complaints at this time. Denies chest pain, sob, orthopnea, edema, palpitations or dizziness. Hypertension   The history is provided by the medical records. Associated symptoms include shortness of breath. Pertinent negatives include no chest pain and no headaches. Cholesterol Problem   The history is provided by the medical records. Associated symptoms include shortness of breath. Pertinent negatives include no chest pain and no headaches. Shortness of Breath   The history is provided by the patient. This is a new problem. The problem occurs intermittently. The current episode started more than 1 week ago. The problem has been gradually improving. Pertinent negatives include no fever, no headaches, no cough, no wheezing, no PND, no orthopnea, no chest pain, no vomiting, no rash, no leg swelling and no claudication. The problem's precipitants include exercise (working in hot weather; carrying heavy stuff). Review of Systems   Constitutional: Negative for chills, diaphoresis, fever, malaise/fatigue and weight loss. HENT: Negative for nosebleeds. Eyes: Negative for discharge. Respiratory: Positive for shortness of breath. Negative for cough and wheezing. Cardiovascular: Negative for chest pain, palpitations, orthopnea, claudication, leg swelling and PND. Gastrointestinal: Negative for diarrhea, nausea and vomiting. Genitourinary: Negative for dysuria and hematuria. Musculoskeletal: Negative for joint pain. Skin: Negative for rash. Neurological: Negative for dizziness, seizures, loss of consciousness and headaches. Endo/Heme/Allergies: Negative for polydipsia. Does not bruise/bleed easily.    Psychiatric/Behavioral: Negative for depression and substance abuse. The patient does not have insomnia. Allergies   Allergen Reactions    Pcn [Penicillins] Rash       Past Medical History:   Diagnosis Date    Essential hypertension with goal blood pressure less than 130/80 7/19/2016    Hyperlipemia 8/1/2016       Family History   Problem Relation Age of Onset    Heart Attack Neg Hx     Stroke Neg Hx        Social History   Substance Use Topics    Smoking status: Former Smoker     Types: Pipe     Quit date: 5/25/2002    Smokeless tobacco: Never Used    Alcohol use 46.8 oz/week     36 Standard drinks or equivalent, 28 Shots of liquor, 14 Cans of beer per week      Comment: moderate use 2 beers per day and 4 mixed drinks         Current Outpatient Prescriptions   Medication Sig    lisinopril (PRINIVIL, ZESTRIL) 40 mg tablet TAKE ONE TABLET BY MOUTH DAILY    amLODIPine (NORVASC) 5 mg tablet TAKE ONE TABLET BY MOUTH DAILY    fenofibrate (LOFIBRA) 160 mg tablet TAKE ONE TABLET BY MOUTH DAILY     No current facility-administered medications for this visit. No past surgical history on file. Diagnostic Studies: Old records reviewed and show as follows  EKG tracings reviewed by me today. CARDIOLOGY STUDIES 5/12/2016   EKG Result SR, LAHB/RBBB   Some recent data might be hidden   6/16 ECHO  SUMMARY:  Left ventricle: Systolic function was normal. Ejection fraction was  estimated in the range of 55 % to 60 %. There were no regional wall motion  abnormalities. Wall thickness was mildly increased. Right ventricle: The ventricle was mildly dilated. Mitral valve: There was trivial regurgitation. Tricuspid valve: Tricuspid regurgitation peak velocity: 2 m/sec. Pulmonary  artery systolic pressure: 19 mmHg. 6/16 TMT  Conclusion:    1. No ischemic ST-T changes at 100% of predicted maximal heart rate.     2. Normal functional capacity.    3.  Appropriate heart rate and moderately hypertensive blood pressure response with baseline hypertension.     4. The patient will require medications for hypertension and Lisinopril 10 mg a day was given today.  Follow up BMP.  Side effects were explained including cough.     5. Clinical correlation is suggested    Visit Vitals    /78    Pulse 67    Ht 5' 7\" (1.702 m)    Wt 84.8 kg (187 lb)    BMI 29.29 kg/m2       Mr. Modesta Alexis has a reminder for a \"due or due soon\" health maintenance. I have asked that he contact his primary care provider for follow-up on this health maintenance. Physical Exam   Constitutional: He is oriented to person, place, and time. He appears well-developed and well-nourished. No distress. HENT:   Head: Normocephalic and atraumatic. Mouth/Throat: Normal dentition. Eyes: Right eye exhibits no discharge. Left eye exhibits no discharge. No scleral icterus. Neck: Neck supple. No JVD present. Carotid bruit is not present. No thyromegaly present. Cardiovascular: Normal rate, regular rhythm, S1 normal, S2 normal, normal heart sounds and intact distal pulses. Exam reveals no gallop and no friction rub. No murmur heard. Pulmonary/Chest: Effort normal and breath sounds normal. He has no wheezes. He has no rales. Abdominal: Soft. He exhibits no mass. There is no tenderness. Musculoskeletal: He exhibits no edema. Lymphadenopathy:        Right cervical: No superficial cervical adenopathy present. Left cervical: No superficial cervical adenopathy present. Neurological: He is alert and oriented to person, place, and time. Skin: Skin is warm and dry. No rash noted. Psychiatric: He has a normal mood and affect. His behavior is normal.       ASSESSMENT and PLAN    Patient advised to take alcohol in moderation to avoid future cardiac and liver problems including arrhythmias, cardiomyopathy and congestive heart failure. Diagnoses and all orders for this visit:    1. RBBB with left anterior fascicular block  Comments:  3/18; 6/16 no symptoms    2.  Essential hypertension with goal blood pressure less than 130/80  Comments:  10/18 controlled/3/18 add norvasc; 11/16 controlled    3. Pure hypercholesterolemia  Comments:  Lipds ordered. 3/18 restart fenofibrate; 5/17 TG 1133; 8/16 [id number]; HDL 3  Orders:  -     METABOLIC PANEL, COMPREHENSIVE; Future  -     LIPID PANEL; Future  -     HEPATIC FUNCTION PANEL; Future    4. Alcohol abuse  Comments:  3/18; 5/17; 7/16 explained again;  6/16 Patient advised to take alcohol in moderation to avoid future cardiac and liver problems including arrhythmias, cardiomy        Pertinent laboratory and test data reviewed and discussed with patient. See patient instructions also for other medical advice given    There are no discontinued medications. Follow-up Disposition:  Return in about 6 months (around 4/12/2019), or if symptoms worsen or fail to improve. Patient seen independently  Discussed the details with NP and patient.  Please see orders & recommendations  Ricardo Carmen MD

## 2018-10-12 NOTE — PATIENT INSTRUCTIONS
Have fasting blood work drawn - will call with results    Continue current medications    Low sodium diet         Heart-Healthy Diet: Care Instructions  Your Care Instructions    A heart-healthy diet has lots of vegetables, fruits, nuts, beans, and whole grains, and is low in salt. It limits foods that are high in saturated fat, such as meats, cheeses, and fried foods. It may be hard to change your diet, but even small changes can lower your risk of heart attack and heart disease. Follow-up care is a key part of your treatment and safety. Be sure to make and go to all appointments, and call your doctor if you are having problems. It's also a good idea to know your test results and keep a list of the medicines you take. How can you care for yourself at home? Watch your portions  · Learn what a serving is. A \"serving\" and a \"portion\" are not always the same thing. Make sure that you are not eating larger portions than are recommended. For example, a serving of pasta is ½ cup. A serving size of meat is 2 to 3 ounces. A 3-ounce serving is about the size of a deck of cards. Measure serving sizes until you are good at [place]" them. Keep in mind that restaurants often serve portions that are 2 or 3 times the size of one serving. · To keep your energy level up and keep you from feeling hungry, eat often but in smaller portions. · Eat only the number of calories you need to stay at a healthy weight. If you need to lose weight, eat fewer calories than your body burns (through exercise and other physical activity). Eat more fruits and vegetables  · Eat a variety of fruit and vegetables every day. Dark green, deep orange, red, or yellow fruits and vegetables are especially good for you. Examples include spinach, carrots, peaches, and berries. · Keep carrots, celery, and other veggies handy for snacks. Buy fruit that is in season and store it where you can see it so that you will be tempted to eat it.   · Cook dishes that have a lot of veggies in them, such as stir-fries and soups. Limit saturated and trans fat  · Read food labels, and try to avoid saturated and trans fats. They increase your risk of heart disease. Trans fat is found in many processed foods such as cookies and crackers. · Use olive or canola oil when you cook. Try cholesterol-lowering spreads, such as Benecol or Take Control. · Bake, broil, grill, or steam foods instead of frying them. · Choose lean meats instead of high-fat meats such as hot dogs and sausages. Cut off all visible fat when you prepare meat. · Eat fish, skinless poultry, and meat alternatives such as soy products instead of high-fat meats. Soy products, such as tofu, may be especially good for your heart. · Choose low-fat or fat-free milk and dairy products. Eat fish  · Eat at least two servings of fish a week. Certain fish, such as salmon and tuna, contain omega-3 fatty acids, which may help reduce your risk of heart attack. Eat foods high in fiber  · Eat a variety of grain products every day. Include whole-grain foods that have lots of fiber and nutrients. Examples of whole-grain foods include oats, whole wheat bread, and brown rice. · Buy whole-grain breads and cereals, instead of white bread or pastries. Limit salt and sodium  · Limit how much salt and sodium you eat to help lower your blood pressure. · Taste food before you salt it. Add only a little salt when you think you need it. With time, your taste buds will adjust to less salt. · Eat fewer snack items, fast foods, and other high-salt, processed foods. Check food labels for the amount of sodium in packaged foods. · Choose low-sodium versions of canned goods (such as soups, vegetables, and beans). Limit sugar  · Limit drinks and foods with added sugar. These include candy, desserts, and soda pop. Limit alcohol  · Limit alcohol to no more than 2 drinks a day for men and 1 drink a day for women.  Too much alcohol can cause health problems. When should you call for help? Watch closely for changes in your health, and be sure to contact your doctor if:    · You would like help planning heart-healthy meals. Where can you learn more? Go to http://balta-kevin.info/. Enter V137 in the search box to learn more about \"Heart-Healthy Diet: Care Instructions. \"  Current as of: December 6, 2017  Content Version: 11.8  © 2920-4162 [employer]. Care instructions adapted under license by [employer] (which disclaims liability or warranty for this information). If you have questions about a medical condition or this instruction, always ask your healthcare professional. [place] [address] any warranty or liability for your use of this information.

## 2018-10-12 NOTE — PROGRESS NOTES
1. Have you been to the ER, urgent care clinic since your last visit? Hospitalized since your last visit? No    2. Have you seen or consulted any other health care providers outside of the [address] [place] [address] since your last visit? Include any pap smears or colon screening. No     3. Since your last visit, have you had any of the following symptoms? shortness of breath. Explain: walking up stairs, with heavy objects    4. Have you had any blood work, X-rays or cardiac testing? No      5. Where do you normally have your labs drawn? [facility]    6. Do you need any refills today?    No

## 2018-10-17 DIAGNOSIS — I10 ESSENTIAL HYPERTENSION WITH GOAL BLOOD PRESSURE LESS THAN 130/80: ICD-10-CM

## 2018-10-17 DIAGNOSIS — E78.5 HYPERLIPIDEMIA, UNSPECIFIED HYPERLIPIDEMIA TYPE: ICD-10-CM

## 2018-10-18 RX ORDER — FENOFIBRATE 160 MG/1
TABLET ORAL
Qty: 30 TAB | Refills: 1 | Status: SHIPPED | OUTPATIENT
Start: 2018-10-18 | End: 2018-12-20 | Stop reason: SDUPTHER

## 2018-10-18 RX ORDER — AMLODIPINE BESYLATE 5 MG/1
TABLET ORAL
Qty: 30 TAB | Refills: 1 | Status: SHIPPED | OUTPATIENT
Start: 2018-10-18 | End: 2018-12-20 | Stop reason: SDUPTHER

## 2018-12-10 ENCOUNTER — HOSPITAL ENCOUNTER (OUTPATIENT)
Dept: LAB | Age: 48
Discharge: HOME | End: 2018-12-10
Payer: SELF-PAY

## 2018-12-10 ENCOUNTER — TELEPHONE (OUTPATIENT)
Dept: CARDIOLOGY CLINIC | Age: 48
End: 2018-12-10

## 2018-12-10 DIAGNOSIS — E78.00 PURE HYPERCHOLESTEROLEMIA: ICD-10-CM

## 2018-12-10 DIAGNOSIS — E78.5 HYPERLIPIDEMIA, UNSPECIFIED HYPERLIPIDEMIA TYPE: Primary | ICD-10-CM

## 2018-12-10 LAB
ALBUMIN SERPL-MCNC: 4.3 G/DL (ref 3.4–5)
ALBUMIN/GLOB SERPL: 1.2 {RATIO} (ref 0.8–1.7)
ALP SERPL-CCNC: 32 U/L (ref 45–117)
ALT SERPL-CCNC: 40 U/L (ref 16–61)
ANION GAP SERPL CALC-SCNC: 5 MMOL/L (ref 3–18)
AST SERPL-CCNC: 37 U/L (ref 15–37)
BILIRUB SERPL-MCNC: 0.5 MG/DL (ref 0.2–1)
BUN SERPL-MCNC: 15 MG/DL (ref 7–18)
BUN/CREAT SERPL: 15 (ref 12–20)
CALCIUM SERPL-MCNC: 9.3 MG/DL (ref 8.5–10.1)
CHLORIDE SERPL-SCNC: 104 MMOL/L (ref 100–108)
CHOLEST SERPL-MCNC: 234 MG/DL
CO2 SERPL-SCNC: 31 MMOL/L (ref 21–32)
CREAT SERPL-MCNC: 1.02 MG/DL (ref 0.6–1.3)
GLOBULIN SER CALC-MCNC: 3.5 G/DL (ref 2–4)
GLUCOSE SERPL-MCNC: 89 MG/DL (ref 74–99)
HDLC SERPL-MCNC: 78 MG/DL (ref 40–60)
HDLC SERPL: 3 {RATIO} (ref 0–5)
LDLC SERPL CALC-MCNC: 125 MG/DL (ref 0–100)
LIPID PROFILE,FLP: ABNORMAL
POTASSIUM SERPL-SCNC: 4.3 MMOL/L (ref 3.5–5.5)
PROT SERPL-MCNC: 7.8 G/DL (ref 6.4–8.2)
SODIUM SERPL-SCNC: 140 MMOL/L (ref 136–145)
TRIGL SERPL-MCNC: 155 MG/DL (ref ?–150)
VLDLC SERPL CALC-MCNC: 31 MG/DL

## 2018-12-10 PROCEDURE — 36415 COLL VENOUS BLD VENIPUNCTURE: CPT

## 2018-12-10 PROCEDURE — 82248 BILIRUBIN DIRECT: CPT

## 2018-12-10 PROCEDURE — 80061 LIPID PANEL: CPT

## 2018-12-10 PROCEDURE — 80053 COMPREHEN METABOLIC PANEL: CPT

## 2018-12-10 NOTE — TELEPHONE ENCOUNTER
Patient returned called and instructions was given per Dr. Daysi Cox, triglycerides have reduced remarkably and to continue to follow diet and exercise. Diet is important especially now as LDL is still elevated and patient should stop red meats and fried food. Repeat lipids in 6 month. He voices understanding and acceptance of this advice and will call back if any further questions or concerns.

## 2018-12-10 NOTE — TELEPHONE ENCOUNTER
----- Message from Denys Knight MD sent at 12/10/2018 10:59 AM EST -----  Please contact patient and do the following asap    Let patient know that triglycerides have reduced remarkably and to continue to follow diet and exercise. Diet is important especially now as LDL is still elevated and he should stop red meats and fried food. Repeat lipids in 6 months.

## 2018-12-10 NOTE — PROGRESS NOTES
Please contact patient and do the following asap    Let patient know that triglycerides have reduced remarkably and to continue to follow diet and exercise. Diet is important especially now as LDL is still elevated and he should stop red meats and fried food. Repeat lipids in 6 months.

## 2018-12-11 LAB — BILIRUB DIRECT SERPL-MCNC: 0.2 MG/DL (ref 0–0.2)

## 2018-12-20 DIAGNOSIS — E78.5 HYPERLIPIDEMIA, UNSPECIFIED HYPERLIPIDEMIA TYPE: ICD-10-CM

## 2018-12-20 DIAGNOSIS — I10 ESSENTIAL HYPERTENSION WITH GOAL BLOOD PRESSURE LESS THAN 130/80: ICD-10-CM

## 2018-12-20 RX ORDER — FENOFIBRATE 160 MG/1
TABLET ORAL
Qty: 30 TAB | Refills: 5 | Status: SHIPPED | OUTPATIENT
Start: 2018-12-20 | End: 2019-06-21 | Stop reason: SDUPTHER

## 2018-12-20 RX ORDER — LISINOPRIL 40 MG/1
TABLET ORAL
Qty: 30 TAB | Refills: 5 | Status: SHIPPED | OUTPATIENT
Start: 2018-12-20 | End: 2019-04-14

## 2018-12-20 RX ORDER — AMLODIPINE BESYLATE 5 MG/1
TABLET ORAL
Qty: 30 TAB | Refills: 5 | Status: SHIPPED | OUTPATIENT
Start: 2018-12-20 | End: 2019-04-14

## 2019-04-11 ENCOUNTER — OFFICE VISIT (OUTPATIENT)
Dept: CARDIOLOGY CLINIC | Age: 49
End: 2019-04-11

## 2019-04-11 VITALS
HEART RATE: 70 BPM | HEIGHT: 67 IN | SYSTOLIC BLOOD PRESSURE: 124 MMHG | DIASTOLIC BLOOD PRESSURE: 76 MMHG | BODY MASS INDEX: 29.51 KG/M2 | WEIGHT: 188 LBS

## 2019-04-11 DIAGNOSIS — I10 ESSENTIAL HYPERTENSION WITH GOAL BLOOD PRESSURE LESS THAN 130/80: Primary | ICD-10-CM

## 2019-04-11 DIAGNOSIS — E78.00 HYPERCHOLESTEREMIA: ICD-10-CM

## 2019-04-11 DIAGNOSIS — I45.2 RBBB WITH LEFT ANTERIOR FASCICULAR BLOCK: ICD-10-CM

## 2019-04-11 DIAGNOSIS — F10.10 ALCOHOL ABUSE: ICD-10-CM

## 2019-04-11 NOTE — PATIENT INSTRUCTIONS
There are no discontinued medications. Alcohol Detoxification and Withdrawal: Care Instructions  Your Care Instructions    If you drink alcohol regularly and then suddenly stop, you may go through some physical and emotional problems while the alcohol clears out of your system. Clearing the alcohol from your body is called detoxification, or detox. Physical and emotional problems that may happen during detox are called withdrawal.  Symptoms of withdrawal can be scary and dangerous. Mild symptoms include nausea and vomiting, sweating, shakiness, and intense worry. Severe symptoms include being confused and irritable, feeling things on your body that are not there, seeing or hearing things that are not there, and trembling. You may even have seizures. If your symptoms become severe you must see a doctor. People who drink large amounts of alcohol should not try to detox at home. A person can die of severe alcohol withdrawal.  Symptoms of alcohol withdrawal may begin from 4 to 12 hours after you stop drinking. But they may not start for several days after the last drink. They can last a few days. It is hard to stop drinking. But when you have cleared the alcohol from your system, you will be able to start the next part of your life, free from the burden of being dependent. Follow-up care is a key part of your treatment and safety. Be sure to make and go to all appointments, and call your doctor if you are having problems. It's also a good idea to know your test results and keep a list of the medicines you take. How can you care for yourself at home? · Before you stop drinking, talk to your doctor about how you plan to stop. Be sure to be completely honest with him or her about how much you have been drinking. Your doctor will figure out whether you need to detox in a supervised medical center. · Take your medicines exactly as prescribed.  Call your doctor if you think you are having a problem with your medicine. · Make sure someone you trust is with you the whole time. Have friends and family members take turns staying with you until you are done with detox. · Put a list of emergency numbers near the phone. This should include your doctor, the police, the nearest hospital and emergency room, and neighbors who can help if needed. · Make sure all alcohol is removed from the house before you start. This includes beverages as well as medicines, rubbing alcohol, and certain flavorings like vanilla extract. · Keep \"drinking buddies\" away during the time you are going through detox. · Make your surroundings calm. Soft lights, soft music, and a comfortable place to sit or lie down can help make the process easier. · Drink lots of fluids and eat snacks such as fruit, cheese and crackers, and pretzels. Foods high in carbohydrate may help reduce the craving for alcohol. · Understand that detox is going to be hard. · Keep in mind that the people watching over you during detox are there to help. Explain to them before you start that you may not act like yourself until detox is finished. · Consider joining a support group such as Alcoholics Anonymous. Sharing your experiences with other people who face similar challenges may help you feel less overwhelmed. · Keep the numbers for these national suicide hotlines: 3-131-678-TALK (0-174.270.4992) and 8-646-OMOHQYL (1-156.743.9633). If you or someone you know talks about suicide or feeling hopeless, get help right away. When should you call for help? Call 911 anytime you think you may need emergency care.  For example, call if:    · You feel you cannot stop from hurting yourself or someone else.     · You vomit many times and cannot stop.     · You vomit blood or what looks like coffee grounds.     · You have trouble breathing or are breathing very fast.     · Your heart beats more than 120 times a minute and will not slow down.     · You have chest pain.     · You have a seizure.     · You see or feel things that are not there (hallucinate).    If you are caring for someone who is going through detox, call if:    · The person passes out (loses consciousness).     · The person sees or feels things that are not there and sees or hears the same things many times.     · The person is very agitated and will not calm down.     · The person becomes violent or threatens to be violent.     · The person has a seizure.    Call your doctor now or seek immediate medical care if:    · You have a high fever.     · You have severe belly pain.     · You are very shaky.    Watch closely for changes in your health, and be sure to contact your doctor if:    · You do not get better as expected. Where can you learn more? Go to http://balta-kevin.info/. Enter [phone] in the search box to learn more about \"Alcohol Detoxification and Withdrawal: Care Instructions. \"  Current as of: May 7, 2018  Content Version: 11.9  © 4741-6344 [employer]. Care instructions adapted under license by [employer] (which disclaims liability or warranty for this information). If you have questions about a medical condition or this instruction, always ask your healthcare professional. [address] any warranty or liability for your use of this information.

## 2019-04-11 NOTE — PROGRESS NOTES
HISTORY OF PRESENT ILLNESS  Paola Pillai is a 51 y.o. male. 5/17 d/nkechi fenofibrate dur to rectal odor without incontinence  11/16 thinks that he is getting mild bronchitis for last few days- improving  10/18 - Doing well, no complaints at this time. Denies chest pain, sob, orthopnea, edema, palpitations or dizziness. Hypertension   The history is provided by the medical records. Pertinent negatives include no chest pain, no headaches and no shortness of breath. Cholesterol Problem   The history is provided by the medical records. Pertinent negatives include no chest pain, no headaches and no shortness of breath. Shortness of Breath   The history is provided by the patient. This is a new problem. The problem occurs intermittently. The current episode started more than 1 week ago. The problem has been resolved. Pertinent negatives include no fever, no headaches, no cough, no wheezing, no PND, no orthopnea, no chest pain, no vomiting, no rash, no leg swelling and no claudication. The problem's precipitants include exercise (working in hot weather; carrying heavy stuff). Review of Systems   Constitutional: Negative for chills, diaphoresis, fever, malaise/fatigue and weight loss. HENT: Negative for nosebleeds. Eyes: Negative for discharge. Respiratory: Negative for cough, shortness of breath and wheezing. Cardiovascular: Negative for chest pain, palpitations, orthopnea, claudication, leg swelling and PND. Gastrointestinal: Negative for diarrhea, nausea and vomiting. Genitourinary: Negative for dysuria and hematuria. Musculoskeletal: Negative for joint pain. Skin: Negative for rash. Neurological: Negative for dizziness, seizures, loss of consciousness and headaches. Endo/Heme/Allergies: Negative for polydipsia. Does not bruise/bleed easily. Psychiatric/Behavioral: Negative for depression and substance abuse. The patient does not have insomnia.       Allergies   Allergen Reactions  Pcn [Penicillins] Rash       Past Medical History:   Diagnosis Date    Essential hypertension with goal blood pressure less than 130/80 2016    Hyperlipemia 2016       Family History   Problem Relation Age of Onset    Heart Attack Neg Hx     Stroke Neg Hx        Social History     Tobacco Use    Smoking status: Former Smoker     Types: Pipe     Last attempt to quit: 2002     Years since quittin.8    Smokeless tobacco: Never Used   Substance Use Topics    Alcohol use: Yes     Alcohol/week: 46.8 oz     Types: 14 Cans of beer, 28 Shots of liquor, 36 Standard drinks or equivalent per week     Frequency: 4 or more times a week     Drinks per session: 3 or 4     Binge frequency: Weekly     Comment: moderate use 2 beers per day and 4 mixed drinks     Drug use: No        Current Outpatient Medications   Medication Sig    lisinopril (PRINIVIL, ZESTRIL) 40 mg tablet TAKE ONE TABLET BY MOUTH DAILY    amLODIPine (NORVASC) 5 mg tablet TAKE ONE TABLET BY MOUTH DAILY    fenofibrate (LOFIBRA) 160 mg tablet TAKE ONE TABLET BY MOUTH DAILY     No current facility-administered medications for this visit. History reviewed. No pertinent surgical history. Diagnostic Studies: Old records reviewed and show as follows  EKG tracings reviewed by me today. CARDIOLOGY STUDIES 2016   EKG Result SR, LAHB/RBBB   Some recent data might be hidden    ECHO  SUMMARY:  Left ventricle: Systolic function was normal. Ejection fraction was  estimated in the range of 55 % to 60 %. There were no regional wall motion  abnormalities. Wall thickness was mildly increased. Right ventricle: The ventricle was mildly dilated. Mitral valve: There was trivial regurgitation. Tricuspid valve: Tricuspid regurgitation peak velocity: 2 m/sec. Pulmonary  artery systolic pressure: 19 mmHg.  TMT  Conclusion:    1. No ischemic ST-T changes at 100% of predicted maximal heart rate.     2.  Normal functional capacity.    3. Appropriate heart rate and moderately hypertensive blood pressure response with baseline hypertension.     4. The patient will require medications for hypertension and Lisinopril 10 mg a day was given today.  Follow up BMP.  Side effects were explained including cough.     5. Clinical correlation is suggested    Visit Vitals  /76   Pulse 70   Ht 5' 7\" (1.702 m)   Wt 85.3 kg (188 lb)   BMI 29.44 kg/m²       Mr. Audrey Bianchi has a reminder for a \"due or due soon\" health maintenance. I have asked that he contact his primary care provider for follow-up on this health maintenance. Physical Exam   Constitutional: He is oriented to person, place, and time. He appears well-developed and well-nourished. No distress. HENT:   Head: Normocephalic and atraumatic. Mouth/Throat: Normal dentition. Eyes: Right eye exhibits no discharge. Left eye exhibits no discharge. No scleral icterus. Neck: Neck supple. No JVD present. Carotid bruit is not present. No thyromegaly present. Cardiovascular: Normal rate, regular rhythm, S1 normal, S2 normal, normal heart sounds and intact distal pulses. Exam reveals no gallop and no friction rub. No murmur heard. Pulmonary/Chest: Effort normal and breath sounds normal. He has no wheezes. He has no rales. Abdominal: Soft. He exhibits no mass. There is no tenderness. Musculoskeletal: He exhibits no edema. Lymphadenopathy:        Right cervical: No superficial cervical adenopathy present. Left cervical: No superficial cervical adenopathy present. Neurological: He is alert and oriented to person, place, and time. Skin: Skin is warm and dry. No rash noted. Psychiatric: He has a normal mood and affect. His behavior is normal.       ASSESSMENT and PLAN    Patient advised to take alcohol in moderation to avoid future cardiac and liver problems including arrhythmias, cardiomyopathy and congestive heart failure.     HLD: Results for Arturo Parekh (MRN [id number]) as of 4/11/2019 08:48   Ref. Range 5/8/2017 07:03 3/15/2018 13:10 12/10/2018 07:48   Triglyceride Latest Ref Range: <150 MG/DL 1,133 (H)  155 (H)   Cholesterol, total Latest Ref Range: <200 MG/ (H)  234 (H)   HDL Cholesterol Latest Ref Range: 40 - 60 MG/DL 40  78 (H)   CHOL/HDL Ratio Latest Ref Range: 0 - 5.0   8.3 (H)  3.0   LDL, calculated Latest Ref Range: 0 - 100 MG/DL LDL AND VLDL CHOL. .. 125 (H)   VLDL, calculated Latest Units: MG/DL Calculation not v...  31         Diagnoses and all orders for this visit:    1. Essential hypertension with goal blood pressure less than 130/80  -     AMB POC EKG ROUTINE W/ 12 LEADS, INTER & REP    2. Hypercholesteremia  -     LIPID PANEL; Future  -     HEPATIC FUNCTION PANEL; Future    3. Alcohol abuse    4. RBBB with left anterior fascicular block        Pertinent laboratory and test data reviewed and discussed with patient. See patient instructions also for other medical advice given    There are no discontinued medications. Follow-up and Dispositions    · Return in about 1 year (around 4/11/2020), or if symptoms worsen or fail to improve.

## 2019-04-11 NOTE — PROGRESS NOTES
1. Have you been to the ER, urgent care clinic since your last visit? Hospitalized since your last visit? No    2. Have you seen or consulted any other health care providers outside of the [address] [place] [address] since your last visit? Include any pap smears or colon screening. No     3. Since your last visit, have you had any of the following symptoms? No    4. Have you had any blood work, X-rays or cardiac testing? Yes Where: [facility]     Requested: YES     In [facility]: YES    5. Where do you normally have your labs drawn? [facility]    6. Do you need any refills today?    No

## 2019-04-12 ENCOUNTER — HOSPITAL ENCOUNTER (INPATIENT)
Age: 49
LOS: 2 days | Discharge: HOME | DRG: 916 | End: 2019-04-14
Attending: EMERGENCY MEDICINE | Admitting: INTERNAL MEDICINE
Payer: SELF-PAY

## 2019-04-12 DIAGNOSIS — T78.3XXA ANGIOEDEMA, INITIAL ENCOUNTER: Primary | ICD-10-CM

## 2019-04-12 LAB
ABO + RH BLD: NORMAL
ANION GAP SERPL CALC-SCNC: 9 MMOL/L (ref 3–18)
APTT PPP: 23.5 SEC (ref 23–36.4)
BASOPHILS # BLD: 0 K/UL (ref 0–0.1)
BASOPHILS NFR BLD: 1 % (ref 0–2)
BLOOD GROUP ANTIBODIES SERPL: NORMAL
BUN SERPL-MCNC: 16 MG/DL (ref 7–18)
BUN/CREAT SERPL: 14 (ref 12–20)
CALCIUM SERPL-MCNC: 10.4 MG/DL (ref 8.5–10.1)
CHLORIDE SERPL-SCNC: 105 MMOL/L (ref 100–108)
CO2 SERPL-SCNC: 28 MMOL/L (ref 21–32)
CREAT SERPL-MCNC: 1.15 MG/DL (ref 0.6–1.3)
DIFFERENTIAL METHOD BLD: ABNORMAL
EOSINOPHIL # BLD: 0.5 K/UL (ref 0–0.4)
EOSINOPHIL NFR BLD: 7 % (ref 0–5)
ERYTHROCYTE [DISTWIDTH] IN BLOOD BY AUTOMATED COUNT: 13 % (ref 11.6–14.5)
GLUCOSE SERPL-MCNC: 89 MG/DL (ref 74–99)
HCT VFR BLD AUTO: 39.4 % (ref 36–48)
HGB BLD-MCNC: 13.4 G/DL (ref 13–16)
INR PPP: 1 (ref 0.8–1.2)
LYMPHOCYTES # BLD: 1.5 K/UL (ref 0.9–3.6)
LYMPHOCYTES NFR BLD: 23 % (ref 21–52)
MCH RBC QN AUTO: 32.6 PG (ref 24–34)
MCHC RBC AUTO-ENTMCNC: 34 G/DL (ref 31–37)
MCV RBC AUTO: 95.9 FL (ref 74–97)
MONOCYTES # BLD: 0.5 K/UL (ref 0.05–1.2)
MONOCYTES NFR BLD: 8 % (ref 3–10)
NEUTS SEG # BLD: 4.1 K/UL (ref 1.8–8)
NEUTS SEG NFR BLD: 61 % (ref 40–73)
PLATELET # BLD AUTO: 284 K/UL (ref 135–420)
PMV BLD AUTO: 9.7 FL (ref 9.2–11.8)
POTASSIUM SERPL-SCNC: 3.7 MMOL/L (ref 3.5–5.5)
PROTHROMBIN TIME: 12.4 SEC (ref 11.5–15.2)
RBC # BLD AUTO: 4.11 M/UL (ref 4.7–5.5)
SODIUM SERPL-SCNC: 142 MMOL/L (ref 136–145)
SPECIMEN EXP DATE BLD: NORMAL
WBC # BLD AUTO: 6.6 K/UL (ref 4.6–13.2)

## 2019-04-12 PROCEDURE — 36430 TRANSFUSION BLD/BLD COMPNT: CPT

## 2019-04-12 PROCEDURE — 99283 EMERGENCY DEPT VISIT LOW MDM: CPT

## 2019-04-12 PROCEDURE — 85025 COMPLETE CBC W/AUTO DIFF WBC: CPT

## 2019-04-12 PROCEDURE — 85730 THROMBOPLASTIN TIME PARTIAL: CPT

## 2019-04-12 PROCEDURE — 74011250636 HC RX REV CODE- 250/636: Performed by: EMERGENCY MEDICINE

## 2019-04-12 PROCEDURE — 85610 PROTHROMBIN TIME: CPT

## 2019-04-12 PROCEDURE — 96375 TX/PRO/DX INJ NEW DRUG ADDON: CPT

## 2019-04-12 PROCEDURE — 80048 BASIC METABOLIC PNL TOTAL CA: CPT

## 2019-04-12 PROCEDURE — 96372 THER/PROPH/DIAG INJ SC/IM: CPT

## 2019-04-12 PROCEDURE — 94762 N-INVAS EAR/PLS OXIMTRY CONT: CPT

## 2019-04-12 PROCEDURE — 96376 TX/PRO/DX INJ SAME DRUG ADON: CPT

## 2019-04-12 PROCEDURE — 36600 WITHDRAWAL OF ARTERIAL BLOOD: CPT

## 2019-04-12 PROCEDURE — 96374 THER/PROPH/DIAG INJ IV PUSH: CPT

## 2019-04-12 PROCEDURE — 30233K1 TRANSFUSION OF NONAUTOLOGOUS FROZEN PLASMA INTO PERIPHERAL VEIN, PERCUTANEOUS APPROACH: ICD-10-PCS | Performed by: EMERGENCY MEDICINE

## 2019-04-12 PROCEDURE — 65610000006 HC RM INTENSIVE CARE

## 2019-04-12 PROCEDURE — P9059 PLASMA, FRZ BETWEEN 8-24HOUR: HCPCS

## 2019-04-12 PROCEDURE — 86900 BLOOD TYPING SEROLOGIC ABO: CPT

## 2019-04-12 PROCEDURE — 74011250636 HC RX REV CODE- 250/636

## 2019-04-12 RX ORDER — FAMOTIDINE 10 MG/ML
20 INJECTION INTRAVENOUS
Status: COMPLETED | OUTPATIENT
Start: 2019-04-12 | End: 2019-04-12

## 2019-04-12 RX ORDER — EPINEPHRINE 1 MG/ML
0.15 INJECTION, SOLUTION, CONCENTRATE INTRAVENOUS
Status: COMPLETED | OUTPATIENT
Start: 2019-04-12 | End: 2019-04-12

## 2019-04-12 RX ORDER — SODIUM CHLORIDE 0.9 % (FLUSH) 0.9 %
5-40 SYRINGE (ML) INJECTION EVERY 8 HOURS
Status: DISCONTINUED | OUTPATIENT
Start: 2019-04-13 | End: 2019-04-14 | Stop reason: HOSPADM

## 2019-04-12 RX ORDER — HEPARIN SODIUM 5000 [USP'U]/ML
5000 INJECTION, SOLUTION INTRAVENOUS; SUBCUTANEOUS EVERY 8 HOURS
Status: DISCONTINUED | OUTPATIENT
Start: 2019-04-13 | End: 2019-04-14 | Stop reason: HOSPADM

## 2019-04-12 RX ORDER — SODIUM CHLORIDE 0.9 % (FLUSH) 0.9 %
5-40 SYRINGE (ML) INJECTION AS NEEDED
Status: DISCONTINUED | OUTPATIENT
Start: 2019-04-12 | End: 2019-04-14 | Stop reason: HOSPADM

## 2019-04-12 RX ORDER — DIPHENHYDRAMINE HYDROCHLORIDE 50 MG/ML
50 INJECTION, SOLUTION INTRAMUSCULAR; INTRAVENOUS EVERY 6 HOURS
Status: COMPLETED | OUTPATIENT
Start: 2019-04-13 | End: 2019-04-13

## 2019-04-12 RX ORDER — DIPHENHYDRAMINE HYDROCHLORIDE 50 MG/ML
25 INJECTION, SOLUTION INTRAMUSCULAR; INTRAVENOUS
Status: COMPLETED | OUTPATIENT
Start: 2019-04-12 | End: 2019-04-12

## 2019-04-12 RX ORDER — SODIUM CHLORIDE 9 MG/ML
250 INJECTION, SOLUTION INTRAVENOUS AS NEEDED
Status: DISCONTINUED | OUTPATIENT
Start: 2019-04-12 | End: 2019-04-13 | Stop reason: ALTCHOICE

## 2019-04-12 RX ORDER — DIPHENHYDRAMINE HYDROCHLORIDE 50 MG/ML
INJECTION, SOLUTION INTRAMUSCULAR; INTRAVENOUS
Status: DISCONTINUED
Start: 2019-04-12 | End: 2019-04-12 | Stop reason: WASHOUT

## 2019-04-12 RX ORDER — EPINEPHRINE 1 MG/ML
INJECTION, SOLUTION, CONCENTRATE INTRAVENOUS
Status: COMPLETED
Start: 2019-04-12 | End: 2019-04-12

## 2019-04-12 RX ORDER — ONDANSETRON 2 MG/ML
4 INJECTION INTRAMUSCULAR; INTRAVENOUS
Status: DISCONTINUED | OUTPATIENT
Start: 2019-04-12 | End: 2019-04-14 | Stop reason: HOSPADM

## 2019-04-12 RX ADMIN — DIPHENHYDRAMINE HYDROCHLORIDE 25 MG: 50 INJECTION INTRAMUSCULAR; INTRAVENOUS at 21:22

## 2019-04-12 RX ADMIN — EPINEPHRINE 0.15 MG: 1 INJECTION, SOLUTION, CONCENTRATE INTRAVENOUS at 16:43

## 2019-04-12 RX ADMIN — METHYLPREDNISOLONE SODIUM SUCCINATE 125 MG: 125 INJECTION, POWDER, FOR SOLUTION INTRAMUSCULAR; INTRAVENOUS at 16:52

## 2019-04-12 RX ADMIN — METHYLPREDNISOLONE SODIUM SUCCINATE 125 MG: 125 INJECTION, POWDER, FOR SOLUTION INTRAMUSCULAR; INTRAVENOUS at 21:22

## 2019-04-12 RX ADMIN — FAMOTIDINE 20 MG: 10 INJECTION INTRAVENOUS at 21:21

## 2019-04-12 RX ADMIN — DIPHENHYDRAMINE HYDROCHLORIDE 25 MG: 50 INJECTION, SOLUTION INTRAMUSCULAR; INTRAVENOUS at 21:22

## 2019-04-12 NOTE — ED PROVIDER NOTES
EMERGENCY DEPARTMENT HISTORY AND PHYSICAL EXAM    4:41 PM      Date: 4/12/2019  Patient Name: Abby Kirkland    History of Presenting Illness     Chief Complaint   Patient presents with    Lip Swelling       History Provided By: Patient      Additional History (Context): Abby Kirkland is a 51 y.o. male with hypertension who presents with worsening lower lip swelling x2-3 hours. The pt states he first started noticing his left lower lip swell a couple of hours ago. It has since worsened and spread throughout his lower lip. Of note, the pt is taking Lisinopril, which he took around 0630 this morning. Reports no h/o similar sxs. Denies any difficulty breathing. The pt denies any other complaints. PCP: None    Chief Complaint: lip swelling  Duration:  Hours  Timing:  Progressive  Location: lower lip  Quality: Pressure  Severity: Moderate  Modifying Factors: taking Lisinopril  Associated Symptoms: denies any other associated signs or symptoms    Current Facility-Administered Medications   Medication Dose Route Frequency Provider Last Rate Last Dose    0.9% sodium chloride infusion 250 mL  250 mL IntraVENous PRN Alvina Delgadillo A, DO        0.9% sodium chloride infusion 250 mL  250 mL IntraVENous PRN Alvina Delgadillo, DO         Current Outpatient Medications   Medication Sig Dispense Refill    lisinopril (PRINIVIL, ZESTRIL) 40 mg tablet TAKE ONE TABLET BY MOUTH DAILY 30 Tab 05    amLODIPine (NORVASC) 5 mg tablet TAKE ONE TABLET BY MOUTH DAILY 30 Tab 05    fenofibrate (LOFIBRA) 160 mg tablet TAKE ONE TABLET BY MOUTH DAILY 30 Tab 05       Past History     Past Medical History:  Past Medical History:   Diagnosis Date    Essential hypertension with goal blood pressure less than 130/80 7/19/2016    Hyperlipemia 8/1/2016       Past Surgical History:  No past surgical history on file.     Family History:  Family History   Problem Relation Age of Onset    Heart Attack Neg Hx     Stroke Neg Hx Social History:  Social History     Tobacco Use    Smoking status: Former Smoker     Types: Pipe     Last attempt to quit: 2002     Years since quittin.8    Smokeless tobacco: Never Used   Substance Use Topics    Alcohol use: Yes     Alcohol/week: 46.8 oz     Types: 14 Cans of beer, 28 Shots of liquor, 36 Standard drinks or equivalent per week     Frequency: 4 or more times a week     Drinks per session: 3 or 4     Binge frequency: Weekly     Comment: moderate use 2 beers per day and 4 mixed drinks     Drug use: No       Allergies: Allergies   Allergen Reactions    Ace Inhibitors Angioedema    Pcn [Penicillins] Rash         Review of Systems       Review of Systems   Constitutional: Negative for activity change, fatigue and fever. HENT: Negative for congestion and rhinorrhea. Lower lip swelling   Eyes: Negative for visual disturbance. Respiratory: Negative for shortness of breath. Cardiovascular: Negative for chest pain and palpitations. Gastrointestinal: Negative for abdominal pain, diarrhea, nausea and vomiting. Genitourinary: Negative for dysuria and hematuria. Musculoskeletal: Negative for back pain. Skin: Negative for rash. Neurological: Negative for dizziness, weakness and light-headedness. All other systems reviewed and are negative. Physical Exam     Visit Vitals  /85 (BP 1 Location: Left arm, BP Patient Position: At rest)   Pulse 80   Temp 97.6 °F (36.4 °C)   Resp 16   SpO2 98%         Physical Exam   Constitutional: He is oriented to person, place, and time. He appears well-developed and well-nourished. No distress. HENT:   Head: Normocephalic and atraumatic. Right Ear: External ear normal.   Left Ear: External ear normal.   Nose: Nose normal.   Mouth/Throat: Oropharynx is clear and moist.   Moderate diffuse lower lip edema   Eyes: Pupils are equal, round, and reactive to light. Conjunctivae and EOM are normal. No scleral icterus.    Neck: Normal range of motion. Neck supple. No JVD present. No tracheal deviation present. No thyromegaly present. Cardiovascular: Normal rate, regular rhythm, normal heart sounds and intact distal pulses. Exam reveals no gallop and no friction rub. No murmur heard. Pulmonary/Chest: Effort normal and breath sounds normal. He exhibits no tenderness. Abdominal: Soft. Bowel sounds are normal. He exhibits no distension. There is no tenderness. There is no rebound and no guarding. Musculoskeletal: Normal range of motion. He exhibits no edema or tenderness. Lymphadenopathy:     He has no cervical adenopathy. Neurological: He is alert and oriented to person, place, and time. No cranial nerve deficit. Coordination normal.   Skin: Skin is warm and dry. Psychiatric: He has a normal mood and affect. His behavior is normal. Judgment and thought content normal.   Nursing note and vitals reviewed. Diagnostic Study Results     Labs -  Recent Results (from the past 12 hour(s))   CBC WITH AUTOMATED DIFF    Collection Time: 04/12/19  4:45 PM   Result Value Ref Range    WBC 6.6 4.6 - 13.2 K/uL    RBC 4.11 (L) 4.70 - 5.50 M/uL    HGB 13.4 13.0 - 16.0 g/dL    HCT 39.4 36.0 - 48.0 %    MCV 95.9 74.0 - 97.0 FL    MCH 32.6 24.0 - 34.0 PG    MCHC 34.0 31.0 - 37.0 g/dL    RDW 13.0 11.6 - 14.5 %    PLATELET 540 463 - 637 K/uL    MPV 9.7 9.2 - 11.8 FL    NEUTROPHILS 61 40 - 73 %    LYMPHOCYTES 23 21 - 52 %    MONOCYTES 8 3 - 10 %    EOSINOPHILS 7 (H) 0 - 5 %    BASOPHILS 1 0 - 2 %    ABS. NEUTROPHILS 4.1 1.8 - 8.0 K/UL    ABS. LYMPHOCYTES 1.5 0.9 - 3.6 K/UL    ABS. MONOCYTES 0.5 0.05 - 1.2 K/UL    ABS. EOSINOPHILS 0.5 (H) 0.0 - 0.4 K/UL    ABS.  BASOPHILS 0.0 0.0 - 0.1 K/UL    DF AUTOMATED     METABOLIC PANEL, BASIC    Collection Time: 04/12/19  4:45 PM   Result Value Ref Range    Sodium 142 136 - 145 mmol/L    Potassium 3.7 3.5 - 5.5 mmol/L    Chloride 105 100 - 108 mmol/L    CO2 28 21 - 32 mmol/L    Anion gap 9 3.0 - 18 mmol/L Glucose 89 74 - 99 mg/dL    BUN 16 7.0 - 18 MG/DL    Creatinine 1.15 0.6 - 1.3 MG/DL    BUN/Creatinine ratio 14 12 - 20      GFR est AA >60 >60 ml/min/1.73m2    GFR est non-AA >60 >60 ml/min/1.73m2    Calcium 10.4 (H) 8.5 - 10.1 MG/DL   TYPE & SCREEN    Collection Time: 04/12/19  6:27 PM   Result Value Ref Range    Crossmatch Expiration 04/15/2019     ABO/Rh(D) A POSITIVE     Antibody screen NEG    PROTHROMBIN TIME + INR    Collection Time: 04/12/19  6:27 PM   Result Value Ref Range    Prothrombin time 12.4 11.5 - 15.2 sec    INR 1.0 0.8 - 1.2     PTT    Collection Time: 04/12/19  6:27 PM   Result Value Ref Range    aPTT 23.5 23.0 - 36.4 SEC   PLASMA, ALLOCATE    Collection Time: 04/12/19  6:30 PM   Result Value Ref Range    CALLED TO: David Goins, AT 19:33 ON 4/12/2019 BY [facility]     Unit number [id number]     Blood component type FP 24h, Thaw     Unit division 00     Status of unit ALLOCATED        Radiologic Studies -   No orders to display         Medical Decision Making     It should be noted that I, Sumeet Gunn DO will be the provider of record for this patient. I reviewed the vital signs, available nursing notes, past medical history, past surgical history, family history and social history. Vital Signs-Reviewed the patient's vital signs. Pulse Oximetry Analysis -  98 on room air (Interpretation)WNL    Records Reviewed: Nursing Notes and Old Medical Records (Time of Review: 4:41 PM)    ED Course: Progress Notes, Reevaluation, and Consults:  5:39 PM  The pt's lower lip is still swollen with blister on the inside of his lip. Oropharynx is clear. No airway compromise. 6:19 PM Consult:  Discussed care with the ICU. They states to consult anaesthesilogy. 6:20 PM Consult:  Discussed care with Dr. Birgit Courtney, Specialty: Anaesthesia  Standard discussion; including history of patients chief complaint, available diagnostic results, and treatment course.  He will consult on the pt.     6:36 PM  Discussed with Dr. Yost Stillwater  anesthesiology) who is at the pt's bedside and agrees the pt is not an emergent airway at this time. 6:54 PM Consult:  Discussed care with Dr. Mi Parks, Specialty: ICU  Standard discussion; including history of patients chief complaint, available diagnostic results, and treatment course. Accepts the pt to the ICU. Provider Notes (Medical Decision Making):   Patient is a 58-year-old male on an ACE inhibitor, lisinopril who presents with angioedema of his lower lip. Patient took the medication at 6 AM.  Over 3 hours he has been in the emergency department, patient has had increased swelling of his lower left and head describes some scratchiness of the back of his throat. His tongue and oropharynx have remain clear with no evidence for swelling. Anesthesia has evaluated the patient and agree that he is not an impending airway at this time. Patient was discussed with Dr. Rianna Mcadams who admit the patient to the ICU at this time until resolution of angioedema. Patient is agreeable to this plan. Stable time of admission. Critical Care Time:  The services I provided to this patient were to treat and/or prevent clinically significant deterioration that could result in the failure of one or more body systems and/or organ systems due to angioedema. Services included the following:  -reviewing nursing notes and old charts  -vital sign assessments  -direct patient care  -medication orders and management  -interpreting and reviewing diagnostic studies/labs  -re-evaluations  -documentation time    Aggregate critical care time was 45 minutes, which includes only time during which I was engaged in work directly related to the patient's care as described above, whether I was at bedside or elsewhere in the Emergency Department. It did not include time spent performing other reported procedures or the services of residents, students, nurses, or advance practice providers.        [id number] [facility], Ankush Ruiz DO    6:53 PM    For Hospitalized Patients:    1. Hospitalization Decision Time:  The decision to hospitalize the patient was made by Dr. Alexa Bateman at [address] [place] [address] on 4/12/2019    2. Aspirin: Aspirin was not given because the patient did not present with a stroke at the time of their Emergency Department evaluation    Diagnosis     Clinical Impression:   1. Angioedema, initial encounter        Disposition: admission      Patient's Medications   Start Taking    No medications on file   Continue Taking    AMLODIPINE (NORVASC) 5 MG TABLET    TAKE ONE TABLET BY MOUTH DAILY    FENOFIBRATE (LOFIBRA) 160 MG TABLET    TAKE ONE TABLET BY MOUTH DAILY    LISINOPRIL (PRINIVIL, ZESTRIL) 40 MG TABLET    TAKE ONE TABLET BY MOUTH DAILY   These Medications have changed    No medications on file   Stop Taking    No medications on file     _______________________________       Keren Mcbride acting as a scribe for and in the presence of Leslie Scruggs DO      April 12, 2019 at 5:13 PM       Provider Attestation:      I personally performed the services described in the documentation, reviewed the documentation, as recorded by the scribe in my presence, and it accurately and completely records my words and actions.  April 12, 2019 at 5:13 PM - Leslie GILBERT DO    _______________________________

## 2019-04-12 NOTE — ED NOTES
Bedside report handed off to [employer]. All essential information handed off. Pt stable at this time.

## 2019-04-12 NOTE — PHYSICIAN ADVISORY
Called to ER for ACE inhibitor swelling for possible ICU Admit  Drug 12 hours ago or so  Swelling of lip lower but no other swelling noted in oral cavity  No resp distress Pt comfortable Steroids given and FFP per ER doc  Don't see any need for Intubation at this time and don't anticipate further swelling La Vernia Leah CRNA also at bedside    Keith Roblero MD

## 2019-04-12 NOTE — ED TRIAGE NOTES
Patient arrived from home c.o lip swelling. Patient states he took blood pressure medications this morning possibly lisinopril. Patient states swelling started 2 hrs ago. Patient denies difficulty swallowing.

## 2019-04-13 LAB
ANION GAP SERPL CALC-SCNC: 9 MMOL/L (ref 3–18)
BASOPHILS # BLD: 0 K/UL (ref 0–0.1)
BASOPHILS NFR BLD: 0 % (ref 0–2)
BLD PROD TYP BPU: NORMAL
BPU ID: NORMAL
BUN SERPL-MCNC: 16 MG/DL (ref 7–18)
BUN/CREAT SERPL: 15 (ref 12–20)
CALCIUM SERPL-MCNC: 9.8 MG/DL (ref 8.5–10.1)
CALLED TO:,BCALL1: NORMAL
CHLORIDE SERPL-SCNC: 106 MMOL/L (ref 100–108)
CO2 SERPL-SCNC: 25 MMOL/L (ref 21–32)
CREAT SERPL-MCNC: 1.05 MG/DL (ref 0.6–1.3)
CRP SERPL-MCNC: 0.4 MG/DL (ref 0–0.3)
DIFFERENTIAL METHOD BLD: ABNORMAL
EOSINOPHIL # BLD: 0 K/UL (ref 0–0.4)
EOSINOPHIL NFR BLD: 0 % (ref 0–5)
ERYTHROCYTE [DISTWIDTH] IN BLOOD BY AUTOMATED COUNT: 13 % (ref 11.6–14.5)
GLUCOSE BLD STRIP.AUTO-MCNC: 116 MG/DL (ref 70–110)
GLUCOSE BLD STRIP.AUTO-MCNC: 146 MG/DL (ref 70–110)
GLUCOSE BLD STRIP.AUTO-MCNC: 147 MG/DL (ref 70–110)
GLUCOSE BLD STRIP.AUTO-MCNC: 155 MG/DL (ref 70–110)
GLUCOSE SERPL-MCNC: 136 MG/DL (ref 74–99)
HCT VFR BLD AUTO: 37.1 % (ref 36–48)
HGB BLD-MCNC: 12.5 G/DL (ref 13–16)
LYMPHOCYTES # BLD: 0.5 K/UL (ref 0.9–3.6)
LYMPHOCYTES NFR BLD: 9 % (ref 21–52)
MAGNESIUM SERPL-MCNC: 1.7 MG/DL (ref 1.6–2.6)
MCH RBC QN AUTO: 32.4 PG (ref 24–34)
MCHC RBC AUTO-ENTMCNC: 33.7 G/DL (ref 31–37)
MCV RBC AUTO: 96.1 FL (ref 74–97)
MONOCYTES # BLD: 0 K/UL (ref 0.05–1.2)
MONOCYTES NFR BLD: 0 % (ref 3–10)
NEUTS SEG # BLD: 4.6 K/UL (ref 1.8–8)
NEUTS SEG NFR BLD: 91 % (ref 40–73)
PHOSPHATE SERPL-MCNC: 2.8 MG/DL (ref 2.5–4.9)
PLATELET # BLD AUTO: 257 K/UL (ref 135–420)
PMV BLD AUTO: 9.5 FL (ref 9.2–11.8)
POTASSIUM SERPL-SCNC: 3.7 MMOL/L (ref 3.5–5.5)
RBC # BLD AUTO: 3.86 M/UL (ref 4.7–5.5)
SODIUM SERPL-SCNC: 140 MMOL/L (ref 136–145)
STATUS OF UNIT,%ST: NORMAL
UNIT DIVISION, %UDIV: 0
WBC # BLD AUTO: 5 K/UL (ref 4.6–13.2)

## 2019-04-13 PROCEDURE — 83735 ASSAY OF MAGNESIUM: CPT

## 2019-04-13 PROCEDURE — 65660000000 HC RM CCU STEPDOWN

## 2019-04-13 PROCEDURE — 86332 IMMUNE COMPLEX ASSAY: CPT

## 2019-04-13 PROCEDURE — 85025 COMPLETE CBC W/AUTO DIFF WBC: CPT

## 2019-04-13 PROCEDURE — 74011250637 HC RX REV CODE- 250/637: Performed by: INTERNAL MEDICINE

## 2019-04-13 PROCEDURE — 74011000250 HC RX REV CODE- 250: Performed by: PHYSICIAN ASSISTANT

## 2019-04-13 PROCEDURE — 80048 BASIC METABOLIC PNL TOTAL CA: CPT

## 2019-04-13 PROCEDURE — 86160 COMPLEMENT ANTIGEN: CPT

## 2019-04-13 PROCEDURE — 36415 COLL VENOUS BLD VENIPUNCTURE: CPT

## 2019-04-13 PROCEDURE — 84100 ASSAY OF PHOSPHORUS: CPT

## 2019-04-13 PROCEDURE — 74011250636 HC RX REV CODE- 250/636: Performed by: PHYSICIAN ASSISTANT

## 2019-04-13 PROCEDURE — 86140 C-REACTIVE PROTEIN: CPT

## 2019-04-13 PROCEDURE — 82962 GLUCOSE BLOOD TEST: CPT

## 2019-04-13 RX ORDER — FAMOTIDINE 20 MG/1
20 TABLET, FILM COATED ORAL 2 TIMES DAILY
Status: DISCONTINUED | OUTPATIENT
Start: 2019-04-13 | End: 2019-04-14 | Stop reason: HOSPADM

## 2019-04-13 RX ORDER — INSULIN LISPRO 100 [IU]/ML
INJECTION, SOLUTION INTRAVENOUS; SUBCUTANEOUS
Status: DISCONTINUED | OUTPATIENT
Start: 2019-04-13 | End: 2019-04-14 | Stop reason: HOSPADM

## 2019-04-13 RX ORDER — DEXTROSE 50 % IN WATER (D50W) INTRAVENOUS SYRINGE
25-50 AS NEEDED
Status: DISCONTINUED | OUTPATIENT
Start: 2019-04-13 | End: 2019-04-14 | Stop reason: HOSPADM

## 2019-04-13 RX ORDER — MAGNESIUM SULFATE 100 %
4 CRYSTALS MISCELLANEOUS AS NEEDED
Status: DISCONTINUED | OUTPATIENT
Start: 2019-04-13 | End: 2019-04-14 | Stop reason: HOSPADM

## 2019-04-13 RX ORDER — AMLODIPINE BESYLATE 10 MG/1
10 TABLET ORAL DAILY
Status: DISCONTINUED | OUTPATIENT
Start: 2019-04-14 | End: 2019-04-14 | Stop reason: HOSPADM

## 2019-04-13 RX ADMIN — HEPARIN SODIUM 5000 UNITS: 5000 INJECTION INTRAVENOUS; SUBCUTANEOUS at 00:22

## 2019-04-13 RX ADMIN — METHYLPREDNISOLONE SODIUM SUCCINATE 40 MG: 40 INJECTION, POWDER, FOR SOLUTION INTRAMUSCULAR; INTRAVENOUS at 16:01

## 2019-04-13 RX ADMIN — DIPHENHYDRAMINE HYDROCHLORIDE 50 MG: 50 INJECTION INTRAMUSCULAR; INTRAVENOUS at 13:26

## 2019-04-13 RX ADMIN — FAMOTIDINE 20 MG: 20 TABLET ORAL at 17:46

## 2019-04-13 RX ADMIN — SODIUM CHLORIDE 10 ML: 9 INJECTION, SOLUTION INTRAMUSCULAR; INTRAVENOUS; SUBCUTANEOUS at 06:10

## 2019-04-13 RX ADMIN — DIPHENHYDRAMINE HYDROCHLORIDE 50 MG: 50 INJECTION INTRAMUSCULAR; INTRAVENOUS at 02:16

## 2019-04-13 RX ADMIN — DIPHENHYDRAMINE HYDROCHLORIDE 50 MG: 50 INJECTION INTRAMUSCULAR; INTRAVENOUS at 05:01

## 2019-04-13 RX ADMIN — METHYLPREDNISOLONE SODIUM SUCCINATE 40 MG: 40 INJECTION, POWDER, FOR SOLUTION INTRAMUSCULAR; INTRAVENOUS at 02:16

## 2019-04-13 RX ADMIN — HEPARIN SODIUM 5000 UNITS: 5000 INJECTION INTRAVENOUS; SUBCUTANEOUS at 16:01

## 2019-04-13 RX ADMIN — METHYLPREDNISOLONE SODIUM SUCCINATE 40 MG: 40 INJECTION, POWDER, FOR SOLUTION INTRAMUSCULAR; INTRAVENOUS at 21:11

## 2019-04-13 RX ADMIN — HEPARIN SODIUM 5000 UNITS: 5000 INJECTION INTRAVENOUS; SUBCUTANEOUS at 10:40

## 2019-04-13 RX ADMIN — FAMOTIDINE 20 MG: 10 INJECTION INTRAVENOUS at 10:39

## 2019-04-13 RX ADMIN — METHYLPREDNISOLONE SODIUM SUCCINATE 40 MG: 40 INJECTION, POWDER, FOR SOLUTION INTRAMUSCULAR; INTRAVENOUS at 10:40

## 2019-04-13 RX ADMIN — SODIUM CHLORIDE 10 ML: 9 INJECTION, SOLUTION INTRAMUSCULAR; INTRAVENOUS; SUBCUTANEOUS at 21:16

## 2019-04-13 RX ADMIN — SODIUM CHLORIDE 10 ML: 9 INJECTION, SOLUTION INTRAMUSCULAR; INTRAVENOUS; SUBCUTANEOUS at 16:02

## 2019-04-13 RX ADMIN — FAMOTIDINE 20 MG: 10 INJECTION INTRAVENOUS at 00:23

## 2019-04-13 RX ADMIN — SODIUM CHLORIDE 10 ML: 9 INJECTION, SOLUTION INTRAMUSCULAR; INTRAVENOUS; SUBCUTANEOUS at 00:23

## 2019-04-13 NOTE — ROUTINE PROCESS
TRANSFER - OUT REPORT:    Verbal report given to Lashell Ramos RN(name) on Camila Snider  being transferred to [address] [place] [address](unit) for routine progression of care       Report consisted of patients Situation, Background, Assessment and   Recommendations(SBAR). Information from the following report(s) SBAR, Intake/Output, MAR, Recent Results and Cardiac Rhythm SR/SB was reviewed with the receiving nurse. Lines:   Peripheral IV 04/12/19 Left Antecubital (Active)   Site Assessment Clean, dry, & intact 4/13/2019 12:10 PM   Phlebitis Assessment 0 4/13/2019 12:10 PM   Infiltration Assessment 0 4/13/2019 12:10 PM   Dressing Status Clean, dry, & intact 4/13/2019 12:10 PM   Dressing Type Tape;Transparent 4/13/2019 12:10 PM   Hub Color/Line Status Pink;Capped;Flushed 4/13/2019 12:10 PM   Action Taken Open ports on tubing capped 4/13/2019 12:10 PM   Alcohol Cap Used Yes 4/13/2019 12:10 PM        Opportunity for questions and clarification was provided.       Patient transported with:   [employer]

## 2019-04-13 NOTE — PROGRESS NOTES
Hospitalist Progress Note    Patient: Faviola Barrientos Age: 51 y.o. : 1970 MR#: [id number] SSN: [id number]  Date/Time: 2019 1:13 PM    Admitted to ICU: 2019. Subjective:     He is stable to be transition out of ICU today. Spoke with Dr. Ayanna Eldridge. He was admitted to ICU yesterday due to Angioedema related to ACEi. He was given Solumedrol, Benadryl, FFP. C4 level pending. He did not need further airway protection. Bilateral cheek and upper lip are still swollen. NO pain, slight tenderness. No teeth pain or abscess. No recent trauma to face. No fever, no WBC elevation. Passed bedside swallowing. ROS: no fever/chills, no headache, no dizziness, + facial tenderness/swelling, no sinus congestion,   No swallowing pain, No chest pain, no palpitation, no shortness of breath, no abd pain,  No diarrhea, no urinary complaint, no leg pain or swelling      Assessment/Plan:   1. Angioedema related to ACEi, stable. Still with swollen cheek/lip, monitor for secondary infection. 2.  Hypertension  3. Hyperlipidemia      Will transition to medical floor. Will continue IV solumedrol and pepcid. Monitor for airway compromise. He can eat cardiac diet. Will monitor his WBC and temperature, NOT on antibiotic as low suspicious for cellulitis   Pending C4 complement for further workup. Needs to continue Amlodipine and adjust medications for BP goal <140/80.    Check FSBS due to being on Steroid    DVT prophylaxis: hep sq  Full code     Additional Notes:      Case discussed with:  [x]Patient  [x]Family  []Nursing  []Case Management  DVT Prophylaxis:  []Lovenox  [x]Hep SQ  []SCDs  []Coumadin   []On Heparin gtt    Signed By: Yareli Saha MD     2019 1:13 PM              Objective:   VS:   Visit Vitals  /70 (BP 1 Location: Right arm, BP Patient Position: At rest)   Pulse 66   Temp 98.7 °F (37.1 °C)   Resp 15   Ht 5' 7\" (1.702 m)   Wt 82.3 kg (181 lb 7 oz)   SpO2 100%   BMI 28.42 kg/m² Tmax/24hrs: Temp (24hrs), Av.3 °F (36.8 °C), Min:97.6 °F (36.4 °C), Max:99.1 °F (37.3 °C)      Intake/Output Summary (Last 24 hours) at 2019 1313  Last data filed at 2019 0600  Gross per 24 hour   Intake 100 ml   Output 800 ml   Net -700 ml       Tele: sinus rhythm  General:  Cooperative, Not in acute distress, speaks in full sentence while in bed,  Wife and son at bedside  HEENT: PERRL, EOMI, supple neck, no JVD, dry oral mucosa  Swollen/red cheeks and upper lip. Warm and slight tender to touch. TEETH and GUM without tenderness or abscess. Cardiovascular: S1S2 regular, no rub/gallop   Pulmonary: Clear air entry bilaterally, no wheezing, no crackle  GI:  Soft, non tender, non distended, +bs, no guarding   Extremities:  No pedal edema, +distal pulses appreciated   Neuro: AOx3, moving all extremities, no gross deficit.        Additional:   Current Facility-Administered Medications   Medication Dose Route Frequency    insulin lispro (HUMALOG) injection   SubCUTAneous AC&HS    glucose chewable tablet 16 g  4 Tab Oral PRN    glucagon (GLUCAGEN) injection 1 mg  1 mg IntraMUSCular PRN    dextrose (D50W) injection syrg 12.5-25 g  25-50 mL IntraVENous PRN    [START ON 2019] amLODIPine (NORVASC) tablet 10 mg  10 mg Oral DAILY    sodium chloride (NS) flush 5-40 mL  5-40 mL IntraVENous Q8H    sodium chloride (NS) flush 5-40 mL  5-40 mL IntraVENous PRN    famotidine (PF) (PEPCID) 20 mg in sodium chloride 0.9% 10 mL injection  20 mg IntraVENous Q12H    heparin (porcine) injection 5,000 Units  5,000 Units SubCUTAneous Q8H    diphenhydrAMINE (BENADRYL) injection 50 mg  50 mg IntraVENous Q6H    ondansetron (ZOFRAN) injection 4 mg  4 mg IntraVENous Q6H PRN    methylPREDNISolone (PF) (SOLU-MEDROL) injection 40 mg  40 mg IntraVENous Q6H          Labs:    Recent Results (from the past 24 hour(s))   CBC WITH AUTOMATED DIFF    Collection Time: 19  4:45 PM   Result Value Ref Range    WBC 6.6 4.6 - 13.2 K/uL    RBC 4.11 (L) 4.70 - 5.50 M/uL    HGB 13.4 13.0 - 16.0 g/dL    HCT 39.4 36.0 - 48.0 %    MCV 95.9 74.0 - 97.0 FL    MCH 32.6 24.0 - 34.0 PG    MCHC 34.0 31.0 - 37.0 g/dL    RDW 13.0 11.6 - 14.5 %    PLATELET 953 471 - 751 K/uL    MPV 9.7 9.2 - 11.8 FL    NEUTROPHILS 61 40 - 73 %    LYMPHOCYTES 23 21 - 52 %    MONOCYTES 8 3 - 10 %    EOSINOPHILS 7 (H) 0 - 5 %    BASOPHILS 1 0 - 2 %    ABS. NEUTROPHILS 4.1 1.8 - 8.0 K/UL    ABS. LYMPHOCYTES 1.5 0.9 - 3.6 K/UL    ABS. MONOCYTES 0.5 0.05 - 1.2 K/UL    ABS. EOSINOPHILS 0.5 (H) 0.0 - 0.4 K/UL    ABS.  BASOPHILS 0.0 0.0 - 0.1 K/UL    DF AUTOMATED     METABOLIC PANEL, BASIC    Collection Time: 04/12/19  4:45 PM   Result Value Ref Range    Sodium 142 136 - 145 mmol/L    Potassium 3.7 3.5 - 5.5 mmol/L    Chloride 105 100 - 108 mmol/L    CO2 28 21 - 32 mmol/L    Anion gap 9 3.0 - 18 mmol/L    Glucose 89 74 - 99 mg/dL    BUN 16 7.0 - 18 MG/DL    Creatinine 1.15 0.6 - 1.3 MG/DL    BUN/Creatinine ratio 14 12 - 20      GFR est AA >60 >60 ml/min/1.73m2    GFR est non-AA >60 >60 ml/min/1.73m2    Calcium 10.4 (H) 8.5 - 10.1 MG/DL   TYPE & SCREEN    Collection Time: 04/12/19  6:27 PM   Result Value Ref Range    Crossmatch Expiration 04/15/2019     ABO/Rh(D) A POSITIVE     Antibody screen NEG    PROTHROMBIN TIME + INR    Collection Time: 04/12/19  6:27 PM   Result Value Ref Range    Prothrombin time 12.4 11.5 - 15.2 sec    INR 1.0 0.8 - 1.2     PTT    Collection Time: 04/12/19  6:27 PM   Result Value Ref Range    aPTT 23.5 23.0 - 36.4 SEC   PLASMA, ALLOCATE    Collection Time: 04/12/19  6:30 PM   Result Value Ref Range    CALLED TO: GARRETT REYES, AT 19:33 ON 4/12/2019 BY [facility]     Unit number [id number]     Blood component type FP 24h, Thaw     Unit division 00     Status of unit TRANSFUSED    CBC WITH AUTOMATED DIFF    Collection Time: 04/13/19  2:39 AM   Result Value Ref Range    WBC 5.0 4.6 - 13.2 K/uL    RBC 3.86 (L) 4.70 - 5.50 M/uL    HGB 12.5 (L) 13.0 - 16.0 g/dL HCT 37.1 36.0 - 48.0 %    MCV 96.1 74.0 - 97.0 FL    MCH 32.4 24.0 - 34.0 PG    MCHC 33.7 31.0 - 37.0 g/dL    RDW 13.0 11.6 - 14.5 %    PLATELET 848 852 - 788 K/uL    MPV 9.5 9.2 - 11.8 FL    NEUTROPHILS 91 (H) 40 - 73 %    LYMPHOCYTES 9 (L) 21 - 52 %    MONOCYTES 0 (L) 3 - 10 %    EOSINOPHILS 0 0 - 5 %    BASOPHILS 0 0 - 2 %    ABS. NEUTROPHILS 4.6 1.8 - 8.0 K/UL    ABS. LYMPHOCYTES 0.5 (L) 0.9 - 3.6 K/UL    ABS. MONOCYTES 0.0 (L) 0.05 - 1.2 K/UL    ABS. EOSINOPHILS 0.0 0.0 - 0.4 K/UL    ABS.  BASOPHILS 0.0 0.0 - 0.1 K/UL    DF AUTOMATED     METABOLIC PANEL, BASIC    Collection Time: 04/13/19  2:39 AM   Result Value Ref Range    Sodium 140 136 - 145 mmol/L    Potassium 3.7 3.5 - 5.5 mmol/L    Chloride 106 100 - 108 mmol/L    CO2 25 21 - 32 mmol/L    Anion gap 9 3.0 - 18 mmol/L    Glucose 136 (H) 74 - 99 mg/dL    BUN 16 7.0 - 18 MG/DL    Creatinine 1.05 0.6 - 1.3 MG/DL    BUN/Creatinine ratio 15 12 - 20      GFR est AA >60 >60 ml/min/1.73m2    GFR est non-AA >60 >60 ml/min/1.73m2    Calcium 9.8 8.5 - 10.1 MG/DL   MAGNESIUM    Collection Time: 04/13/19  2:39 AM   Result Value Ref Range    Magnesium 1.7 1.6 - 2.6 mg/dL   PHOSPHORUS    Collection Time: 04/13/19  2:39 AM   Result Value Ref Range    Phosphorus 2.8 2.5 - 4.9 MG/DL   GLUCOSE, POC    Collection Time: 04/13/19 12:04 PM   Result Value Ref Range    Glucose (POC) 116 (H) 70 - 110 mg/dL

## 2019-04-13 NOTE — ROUTINE PROCESS
Bedside swallow exam performed by nurse without any signs of difficulty swallowing. Airway was clear from any aspiration signs. Patient passed. rajinder crackers, applesauce and water was given to complete. Wife and son at bedside. Dr Sulema Lakhani made aware. diet was ordered.

## 2019-04-13 NOTE — H&P
[facility]  Pulmonary, Critical Care, and Sleep Medicine    Name: Erik Salazar MRN: [id number]   : 1970 Hospital: [facility]   Date: 2019        Critical Care History and Physical      IMPRESSION:   · Angioedema - Likely 2/2 Lisinopril. Pt takes Lisinopril daily; states he has been on it for about 2 years now. Admits to some mild facial/lip swelling that occurred about 1 year ago with spontaneously resolve, did not seek medical tx for that episode. Currently, upper & lower lips are swollen; s/p 1u FFP in ED. Started on Solu-medrol, Benadryl and Pepcid in ED. · Hx of HTN - Takes Lisinopril & Norvasc at home; HLD - Takes Fenofibrate at home  · Former Smoker - Quit in . 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 3XXA        RECOMMENDATIONS:   · Resp: Titrate supp O2 for SpO2 >94%; currently comfortable on NC at 2LPM and able to protect airway; strict aspiration precautions - HOB >30'; suction PRN for secretions while lips are swollen; closely monitor lip/facial swelling for worsening. Low threshold to intubate for airway protection. Currently with (-) tongue swelling. Anesthesia aware of pt in case pt requires emergent intubation. · I/D: Afebrile; aleukocytosis; LA normal in ED. No ABX needed. Trend temp & WBC curve. · Hem/Onc: Daily CBC; H/H, and plts are stable. S/p 1u FFP. May require additional FFP pending lip swelling. · CVS: HD stable; goal MAP >65mmHg. Monitor for HTN. Goal SBP <160. PRN Hydralazine if needed. · Metabolic: Daily BMP; monitor e-lytes; replace PRN  · Renal: Trend Renal indices; monitor UOP. Cr slightly bumped from previous.    · Endocrine: POC Glucose PRN; avoid Hypoglycemia. Check C4, C1q levels. Con't Benadryl 50mg q6 overnight; Pepcid 20mg q12 IVP; Solu-Medrol 40mg q6 for first 24hrs. · GI: SUP, NPO; Zofran PRN for N/V.   · Musc/Skin: No acute issues, wound care  · Neuro: No active issues. · Fluids: N/A  · Code Status: Full     Best Practices/Safety Bundles:  · Sepsis Bundle per Hospital Protocol  · Glycemic control; avoid Hypoglycemia  · Stress ulcer prophylaxis: Pepcid  · DVT prophylaxis: SQH  · Need for Lines, dewey assessed. Subjective/History: This patient has been seen and evaluated at the request of Dr. Adriano Cornejo for Acute isolated Angioedema. Patient is a 51 y.o. male, former smoker with PMH of HTN ( on Lisinopril and Norvasc) and HLD who presented to [facility] ED on 04/12/19 c/o sudden onset \"lip swelling\" that started at aprox 1430 on 04/12/19. Pt does take Lisinopril and has been on this medication for about 2 years. Per chart, he last took Lisinopril at around 0630 this morning. Upon presentation to the ED, ED physician reports that only pt's lower lip was swollen; pt notes that swelling had been getting progressively worse since its onset. Denies any EDILMA/SOB, tongue swelling or feelings of his throat closing. Of note, pt does mention that he felt the corner of his lips did swell up about 1 year ago while on Lisinopril, but states that swelling had spontaneous resolve after only a few hrs; pt did not seek medical intervention for this episode. Pt was started on Solu-Medrol, Benadryl and Pepcid in the ED and was given 1u FFP. Anesthesia was consulted by ED for evaluation for possible emergent airway - Dr. Sabra Padilla, pt was determined NOT to be an emergent airway. Pt was then admitted to ICU for close monitoring of his airway. Upon evaluation, pt is alert, AOx3; comfortable on NC. Pt does admit that his upper R lip is more swollen than before.  Currently, he denies any SOB/EDILMA, (-) tongue swelling, denies feelings of throat closing or swelling; denies any CP, abd pain, N/V/D, F/C; HA, dizziness, dysuria. Pt able to protect airway at this time. Able to clear secretions. No evidence of impending airway at this time. Past Medical History:   Diagnosis Date    Essential hypertension with goal blood pressure less than 130/80 2016    Hyperlipemia 2016        No past surgical history on file. Prior to Admission medications    Medication Sig Start Date End Date Taking? Authorizing Provider   lisinopril (PRINIVIL, ZESTRIL) 40 mg tablet TAKE ONE TABLET BY MOUTH DAILY 18   Александр Ortiz NP   amLODIPine (NORVASC) 5 mg tablet TAKE ONE TABLET BY MOUTH DAILY 18   Александр Ortiz NP   fenofibrate (LOFIBRA) 160 mg tablet TAKE ONE TABLET BY MOUTH DAILY 18   Александр Ortiz NP       Current Facility-Administered Medications   Medication Dose Route Frequency    [START ON 2019] sodium chloride (NS) flush 5-40 mL  5-40 mL IntraVENous Q8H    [START ON 2019] famotidine (PF) (PEPCID) 20 mg in sodium chloride 0.9% 10 mL injection  20 mg IntraVENous Q12H    [START ON 2019] heparin (porcine) injection 5,000 Units  5,000 Units SubCUTAneous Q8H    [START ON 2019] diphenhydrAMINE (BENADRYL) injection 50 mg  50 mg IntraVENous Q6H    [START ON 2019] methylPREDNISolone (PF) (SOLU-MEDROL) injection 40 mg  40 mg IntraVENous Q6H       Allergies   Allergen Reactions    Ace Inhibitors Angioedema    Pcn [Penicillins] Rash        Social History     Tobacco Use    Smoking status: Former Smoker     Types: Pipe     Last attempt to quit: 2002     Years since quittin.8    Smokeless tobacco: Never Used   Substance Use Topics    Alcohol use:  Yes     Alcohol/week: 46.8 oz     Types: 14 Cans of beer, 28 Shots of liquor, 36 Standard drinks or equivalent per week     Frequency: 4 or more times a week     Drinks per session: 3 or 4     Binge frequency: Weekly     Comment: moderate use 2 beers per day and 4 mixed drinks Family History   Problem Relation Age of Onset    Heart Attack Neg Hx     Stroke Neg Hx           Review of Systems:  Pertinent items are noted in HPI. Objective:   Vital Signs:    Visit Vitals  /76   Pulse 72   Temp 98.6 °F (37 °C)   Resp 20   Ht 5' 7\" (1.702 m)   Wt 82.3 kg (181 lb 7 oz)   SpO2 100%   BMI 28.42 kg/m²       O2 Device: Nasal cannula   O2 Flow Rate (L/min): 2 l/min   Temp (24hrs), Av.4 °F (36.9 °C), Min:97.6 °F (36.4 °C), Max:99.1 °F (37.3 °C)       Intake/Output:   Last shift:      No intake/output data recorded. Last 3 shifts: No intake/output data recorded. No intake or output data in the 24 hours ending 19 9421      Physical Exam:     General:  Alert, cooperative, NAD   Head:  Normocephalic, without obvious abnormality, atraumatic. Eyes:  Conjunctivae/corneas clear. PERRL. Nose: Nares normal. Septum midline. Mucosa normal. No drainage or sinus tenderness. Throat: Lips, mucosa, and tongue normal. Teeth and gums normal. No swelling noted to throat. Lips are swollen , complete bottom lip, right half of upper lip and into cheek. (-) tongue swelling. Neck: Supple, symmetrical, trachea midline, no adenopathy, No JVD. Lungs:   Symmetrical chest rise; good AE bilat; CTAB; no wheezes/rhonchi/rales noted. Heart:  RRR, S1, S2 normal, no m/r/g   Abdomen:   Soft, non-tender. Bowel sounds normal. No masses,  No organomegaly. Extremities: Extremities normal, atraumatic, no cyanosis or edema. Pulses: 2+ and symmetric all extremities.    Skin: Skin color, texture, turgor normal. No rashes or lesions   Neurologic: Grossly nonfocal   Devices: N/A       Data:     Recent Results (from the past 24 hour(s))   CBC WITH AUTOMATED DIFF    Collection Time: 19  4:45 PM   Result Value Ref Range    WBC 6.6 4.6 - 13.2 K/uL    RBC 4.11 (L) 4.70 - 5.50 M/uL    HGB 13.4 13.0 - 16.0 g/dL    HCT 39.4 36.0 - 48.0 %    MCV 95.9 74.0 - 97.0 FL    MCH 32.6 24.0 - 34.0 PG    MCHC 34.0 31.0 - 37.0 g/dL    RDW 13.0 11.6 - 14.5 %    PLATELET 785 987 - 214 K/uL    MPV 9.7 9.2 - 11.8 FL    NEUTROPHILS 61 40 - 73 %    LYMPHOCYTES 23 21 - 52 %    MONOCYTES 8 3 - 10 %    EOSINOPHILS 7 (H) 0 - 5 %    BASOPHILS 1 0 - 2 %    ABS. NEUTROPHILS 4.1 1.8 - 8.0 K/UL    ABS. LYMPHOCYTES 1.5 0.9 - 3.6 K/UL    ABS. MONOCYTES 0.5 0.05 - 1.2 K/UL    ABS. EOSINOPHILS 0.5 (H) 0.0 - 0.4 K/UL    ABS. BASOPHILS 0.0 0.0 - 0.1 K/UL    DF AUTOMATED     METABOLIC PANEL, BASIC    Collection Time: 04/12/19  4:45 PM   Result Value Ref Range    Sodium 142 136 - 145 mmol/L    Potassium 3.7 3.5 - 5.5 mmol/L    Chloride 105 100 - 108 mmol/L    CO2 28 21 - 32 mmol/L    Anion gap 9 3.0 - 18 mmol/L    Glucose 89 74 - 99 mg/dL    BUN 16 7.0 - 18 MG/DL    Creatinine 1.15 0.6 - 1.3 MG/DL    BUN/Creatinine ratio 14 12 - 20      GFR est AA >60 >60 ml/min/1.73m2    GFR est non-AA >60 >60 ml/min/1.73m2    Calcium 10.4 (H) 8.5 - 10.1 MG/DL   TYPE & SCREEN    Collection Time: 04/12/19  6:27 PM   Result Value Ref Range    Crossmatch Expiration 04/15/2019     ABO/Rh(D) A POSITIVE     Antibody screen NEG    PROTHROMBIN TIME + INR    Collection Time: 04/12/19  6:27 PM   Result Value Ref Range    Prothrombin time 12.4 11.5 - 15.2 sec    INR 1.0 0.8 - 1.2     PTT    Collection Time: 04/12/19  6:27 PM   Result Value Ref Range    aPTT 23.5 23.0 - 36.4 SEC   PLASMA, ALLOCATE    Collection Time: 04/12/19  6:30 PM   Result Value Ref Range    CALLED TO: Toshia Ball, AT 19:33 ON 4/12/2019 BY [facility]     Unit number [id number]     Blood component type FP 24h, Thaw     Unit division 00     Status of unit ISSUED      Telemetry:normal sinus rhythm    Imaging: No new imaging (04/12/19)  I have personally reviewed the patients radiographs and have reviewed the reports:          Marisa De Paz PA-C    Pulmonary Critical Care Medicine  [address] [place] [address] [facility]        Late entry for date of services 4/12/19:  I saw and evaluated the patient, performing the key elements of the service. I discussed the findings, assessment and plan with the PA and agree with the PA's findings and plan as documented in the PA's note. All edits and changes made above or are mentioned in my addendum. Total of 79 min critical care time spent at bedside during the course of care providing evaluation,management and care decisions and ordering appropriate treatment related to critical care problems exclusive of procedures. The reason for providing this level of medical care for this critically ill patient was due a critical illness that impaired one or more vital organ systems such that there was a high probability of imminent or life threatening deterioration in the patients condition. This care involved high complexity decision making to assess, manipulate, and support vital system functions, to treat this degree vital organ system failure and to prevent further life threatening deterioration of the patients condition. 70-year-old male with past medical history of hypertension, hyperlipidemia, presented to [facility] with complaints of sudden onset of lip swelling, that started earlier in the day, while he was working out at [employer]. Patient reportedly taken his blood pressure medications including lisinopril, amlodipine very early in the morning. Patient reports bottom lip became more swollen, followed by parts of his upper lip. Patient denies any dyspnea, difficulty swallowing. He does report some funny feeling in the back of his throat. Workup in the ER revealed patient had angioedema of the lips. Of note, the patient reported to me that he has had symptoms of lip swelling about a year ago, much milder in nature, he did not seek medical care at that time. Patient is alert and oriented, and unlabored.   Unfortunately patient's upper lip began to swell more upon serial exams, however tongue and throat still appear normal.  Will continue Benadryl, steroids, H2 blocker, as well as serial exams to evaluate for possible respiratory failure. Patient also given 2 units FFP. Patient currently able to handle his own secretions, no dysphasia. Patient reports no history of hereditary angioedema. Anesthesiology was also consulted, per the recommendation, will hold off on intubation and perform serial exams throughout the night. Also discussed with the patient to establish care with allergy/immunology as an outpatient.       Shannan Goodman MD/MPH     Pulmonary, Critical Care Medicine  [facility]

## 2019-04-13 NOTE — ED NOTES
TRANSFER - OUT REPORT:    Verbal report given to SORAIDA Ruano RN(name) on Paola Pillai  being transferred to [facility]) for routine progression of care       Report consisted of patients Situation, Background, Assessment and   Recommendations(SBAR). Information from the following report(s) SBAR, ED Summary and MAR was reviewed with the receiving nurse. Lines:   Peripheral IV 04/12/19 Left Antecubital (Active)   Site Assessment Clean, dry, & intact 4/12/2019  4:53 PM   Phlebitis Assessment 0 4/12/2019  4:53 PM   Infiltration Assessment 0 4/12/2019  4:53 PM   Dressing Status Clean, dry, & intact 4/12/2019  4:53 PM   Dressing Type Transparent 4/12/2019  4:53 PM   Hub Color/Line Status Pink;Flushed;Patent 4/12/2019  4:53 PM   Action Taken Blood drawn 4/12/2019  4:53 PM        Opportunity for questions and clarification was provided.       Patient transported with:   O2 @ 2 liters  Tech

## 2019-04-13 NOTE — ROUTINE PROCESS
TRANSFER - IN REPORT:    2226:  Telephone Verbal report received from Trey Garza RN (name) on Camilla Shankar  being received from the E D (unit) for routine progression of care      Report consisted of patients Situation, Background, Assessment and   Recommendations(SBAR). Information from the following report(s) SBAR, Kardex, ED Summary, MAR, Accordion, Recent Results and Cardiac Rhythm SR was reviewed with the receiving nurse. Opportunity for questions and clarification was provided. Assessment completed upon patients arrival to unit and care assumed. Tonight:  Saying facial swelling is either incresed or unchanged. Maintaining his airway. Suctioning his  Own mouth with  Yankauer to lips. 0400:  Upper lip swelling slightly increased with difficulty opening mouth to speak. Tougue still normal sized. No stridor, no hives. 0730:  States breathing well, less saliva to suction and mouth appears smaller. 0800:  Verbal Patient-side shift change report given to SHEREE Shell RN, (oncoming nurse) by Jennifer Fitch RN  (offgoing nurse). Report included the following information SBAR, Kardex, ED Summary, Procedure Summary, Intake/Output, MAR, Recent Results and Med Rec Status. Included:  Intro, hx, two-RN eval of relevant assessment findings, LDAs, skin, diagnostics and infusions.

## 2019-04-13 NOTE — ROUTINE PROCESS
TRANSFER - IN REPORT:    Verbal report received from [facility] RN(name) on Emma Barragan  being received from ICU(unit) for routine progression of care      Report consisted of patients Situation, Background, Assessment and   Recommendations(SBAR). Information from the following report(s) MAR and Recent Results was reviewed with the receiving nurse. Opportunity for questions and clarification was provided. Assessment completed upon patients arrival to unit and care assumed. Primary Nurse Lili Sheridan RN and Sara Garcia RN performed a dual skin assessment on this patient No impairment noted  Jose score is 23      Bedside and Verbal shift change report given to Dilshad (oncoming nurse) by Ernestine Calderon RN (offgoing nurse). Report given with SBAR, Kardex, Intake/Output, MAR, Accordion and Recent Results.

## 2019-04-13 NOTE — PROGRESS NOTES
conducted an initial consultation and Spiritual Assessment for Camila Snider, who is a 51 y.o.,male. Patients Primary Language is: [place]. According to the patients EMR Rastafarian Affiliation is: Other. The reason the Patient came to the hospital is:   Patient Active Problem List    Diagnosis Date Noted    Angioedema 04/12/2019    Hypercholesteremia 08/01/2016    Essential hypertension with goal blood pressure less than 130/80 07/19/2016    Alcohol abuse 05/25/2016    Chest pain 05/25/2016    RBBB with left anterior fascicular block 05/25/2016    SOB (shortness of breath) on exertion 05/25/2016        The  provided the following Interventions:  Initiated a relationship of care and support. Listened empathically as patient shared that he lost his oldest son Neil Dallas to suicide three years ago. He explained that his next oldest son is autistic and home schooled. Provided chaplaincy education. Provided information about Spiritual Care Services. Prayed with patient for his healing and for his family. Chart reviewed. The following outcomes where achieved:  Patient welcomed Reginaldo Sánchez. Patient processed feeling about current hospitalization. Patient expressed gratitude for 's visit. Assessment:  Patient is coping with grief from the loss of his son and his wife's isolation as she is the primary caregiver for their autistic son and he describes her as almost never leaving the house, even to go to the store. Neil Dallas may have more to process in regards to the loss of his son and stresses of caregiving. Patient does not have any Latter day/cultural needs that will affect patients preferences in health care. There are no spiritual or Latter day issues which require intervention at this time. Plan:  Recommend counseling for self-care and emotional/spiritual wellbeing if the opportunity comes up to talk with him more in depth.  Chaplains will continue to follow and will provide pastoral care on an as needed/requested basis.  recommends bedside caregivers page  on duty if patient shows signs of acute spiritual or emotional distress.     [address]   [phone]

## 2019-04-13 NOTE — PROGRESS NOTES
[facility]  ICU Progress Note      Name: Carlton Alpers   : 1970   MRN: [id number]   Date: 2019 6:23 AM     [x]I have reviewed the flowsheet and previous days notes. Events overnight reviewed and discussed with nursing staff. Vital signs and records reviewed. Subjective:  19:  - No new events overnight. - Upper lip swelling progressed to both sides of upper lip overnight  - No tongue swelling; no throat swelling; pt able to protect airway; no distress  - c/o mild pain to lips from swelling  - HD stable; afebrile; aleukocytosis  - Adequate UOP      []The patient is unable to give any meaningful history or review of systems because the patient is:  []Intubated []Sedated   []Unresponsive      []The patient is critically ill on      []Mechanical ventilation []Pressors   []BiPAP []                 ROS:Pertinent items are noted in HPI. Medication Review:  · Pressors - N/A  · Sedation - N/A  · Antibiotics - N/A  · Pain - N/A  · GI/ DVT -  Pepcid; SQH  · Others (other gtts) - N/A      Vital Signs:    Visit Vitals  /89   Pulse 61   Temp 97.9 °F (36.6 °C)   Resp 15   Ht 5' 7\" (1.702 m)   Wt 82.3 kg (181 lb 7 oz)   SpO2 97%   BMI 28.42 kg/m²       O2 Device: Nasal cannula   O2 Flow Rate (L/min): 2 l/min   Temp (24hrs), Av.2 °F (36.8 °C), Min:97.6 °F (36.4 °C), Max:99.1 °F (37.3 °C)       Intake/Output:   Last shift:       1901 -  0700  In: 100 [I.V.:100]  Out: 800 [Urine:800]  Last 3 shifts: No intake/output data recorded. Intake/Output Summary (Last 24 hours) at 2019 5661  Last data filed at 2019 0600  Gross per 24 hour   Intake 100 ml   Output 800 ml   Net -700 ml     Physical Exam:    General: AOx3; NAD  HEENT:  Anicteric sclerae; pink palpebral conjunctivae; mucosa moist; Lips and cheeks visibly swollen; no tongue swelling; no throat swelling; able to protect airway.  No change in voice; able to swallow  Resp:  Symmetrical chest rise, no accessory muscle use; good AE Bilat; no rales/ wheezing/ rhonchi noted  CV:  S1, S2 present; RRR; no m/g/r  GI:  Abdomen soft, non-tender; (+) active bowel sounds  Extremities:  +2 pulses on all extremities; no edema/ cyanosis/ clubbing noted  Skin:  Warm; no rashes/ lesions noted  Neurologic:  Non-focal  Devices:  No NGT/OGT, Central line/ PICC, ETT/tracheostomy, chest tube      DATA:     Current Facility-Administered Medications   Medication Dose Route Frequency    0.9% sodium chloride infusion 250 mL  250 mL IntraVENous PRN    0.9% sodium chloride infusion 250 mL  250 mL IntraVENous PRN    sodium chloride (NS) flush 5-40 mL  5-40 mL IntraVENous Q8H    sodium chloride (NS) flush 5-40 mL  5-40 mL IntraVENous PRN    famotidine (PF) (PEPCID) 20 mg in sodium chloride 0.9% 10 mL injection  20 mg IntraVENous Q12H    heparin (porcine) injection 5,000 Units  5,000 Units SubCUTAneous Q8H    diphenhydrAMINE (BENADRYL) injection 50 mg  50 mg IntraVENous Q6H    ondansetron (ZOFRAN) injection 4 mg  4 mg IntraVENous Q6H PRN    methylPREDNISolone (PF) (SOLU-MEDROL) injection 40 mg  40 mg IntraVENous Q6H         Labs: Results:       Chemistry Recent Labs     04/13/19  0239 04/12/19  1645   * 89    142   K 3.7 3.7    105   CO2 25 28   BUN 16 16   CREA 1.05 1.15   CA 9.8 10.4*   AGAP 9 9   BUCR 15 14      CBC w/Diff Recent Labs     04/13/19  0239 04/12/19  1645   WBC 5.0 6.6   RBC 3.86* 4.11*   HGB 12.5* 13.4   HCT 37.1 39.4    284   GRANS 91* 61   LYMPH 9* 23   EOS 0 7*      Coagulation Recent Labs     04/12/19  1827   PTP 12.4   INR 1.0   APTT 23.5       Liver Enzymes No results for input(s): TP, ALB, TBIL, AP, SGOT, GPT in the last 72 hours. No lab exists for component: DBIL   ABG No results found for: PH, PHI, PCO2, PCO2I, PO2, PO2I, HCO3, HCO3I, FIO2, FIO2I   Microbiology No results for input(s): CULT in the last 72 hours.        Telemetry: [x]Sinus []A-flutter []Paced    []A-fib []Multiple PVCs                  Imaging: No new imaging (04/13)    []I have personally reviewed the patients radiographs  []Radiographs reviewed with radiologist   []No change from prior, tubes and lines in adequate position  []Improved   []Worsening        IMPRESSION:   · Angioedema - Likely 2/2 Lisinopril. Increased upper lip swelling overnight. · Hx of HTN - Takes Lisinopril & Norvasc at home; HLD - Takes Fenofibrate at home  · Former Smoker - Quit in 2002. RECOMMENDATIONS:   · Resp: Titrate supp O2 for SpO2 >94%; currently comfortable on NC at 2LPM and able to protect airway; strict aspiration precautions - HOB >30'; suction PRN; closely monitor lip/facial swelling for worsening. Low threshold to intubate for airway protection. Currently with (-) tongue swelling. Anesthesia aware of pt in case pt requires emergent intubation. · I/D: Afebrile; aleukocytosis. No ABX needed. Trend temp & WBC curve. · Hem/Onc: Daily CBC; H/H, and plts are stable. S/p 1u FFP. May require additional FFP pending lip swelling. · CVS: HD stable; goal MAP >65mmHg. Monitor for HTN. Goal SBP <160. PRN Hydralazine if needed. · Metabolic: Daily BMP; monitor e-lytes; replace PRN  · Renal: Trend Renal indices; monitor UOP. Cr slightly bumped from previous. · Endocrine: POC Glucose PRN; avoid Hypoglycemia. F/u C4, C1q levels. Con't Benadryl 50mg q6 overnight; Pepcid 20mg q12 IVP; Solu-Medrol 40mg q6    · GI: SUP, NPO; Zofran PRN for N/V.   · Musc/Skin: No acute issues, wound care  · Neuro: No active issues. · Code Status: Full  · Discussed with Dr. Camila Hanley Practices/ Safety Bundles:  · Sepsis Bundle per Hospital Protocol  · CAUTI Bundle  · Electrolyte Replacement Bundle  · Glycemic control goal <180; avoid Hypoglycemia  · Stress ulcer prophylaxis: Pepcid   · DVT prophylaxis: SQH  · Need for Lines, dewey assessed.       The patient is: [x] acutely ill Risk of deterioration: [x] moderate    [] critically ill  [] high     [x]See my orders for details    My assessment/plan was discussed with:  [x]Nursing []PT/OT    [x]Respiratory therapy [x]Dr. George Harry MD   []Family []     Karla Ivan PA-C    Pulmonary [address] [place] [address] [facility]

## 2019-04-14 VITALS
SYSTOLIC BLOOD PRESSURE: 146 MMHG | OXYGEN SATURATION: 95 % | RESPIRATION RATE: 18 BRPM | TEMPERATURE: 98.3 F | DIASTOLIC BLOOD PRESSURE: 81 MMHG | BODY MASS INDEX: 28.48 KG/M2 | HEART RATE: 76 BPM | WEIGHT: 181.44 LBS | HEIGHT: 67 IN

## 2019-04-14 LAB
ANION GAP SERPL CALC-SCNC: 5 MMOL/L (ref 3–18)
BUN SERPL-MCNC: 21 MG/DL (ref 7–18)
BUN/CREAT SERPL: 20 (ref 12–20)
C4 SERPL-MCNC: 29 MG/DL (ref 14–44)
CALCIUM SERPL-MCNC: 9.3 MG/DL (ref 8.5–10.1)
CHLORIDE SERPL-SCNC: 105 MMOL/L (ref 100–108)
CO2 SERPL-SCNC: 26 MMOL/L (ref 21–32)
CREAT SERPL-MCNC: 1.03 MG/DL (ref 0.6–1.3)
ERYTHROCYTE [DISTWIDTH] IN BLOOD BY AUTOMATED COUNT: 12.7 % (ref 11.6–14.5)
GLUCOSE BLD STRIP.AUTO-MCNC: 139 MG/DL (ref 70–110)
GLUCOSE SERPL-MCNC: 143 MG/DL (ref 74–99)
HCT VFR BLD AUTO: 37.8 % (ref 36–48)
HGB BLD-MCNC: 13 G/DL (ref 13–16)
MCH RBC QN AUTO: 32.9 PG (ref 24–34)
MCHC RBC AUTO-ENTMCNC: 34.4 G/DL (ref 31–37)
MCV RBC AUTO: 95.7 FL (ref 74–97)
PLATELET # BLD AUTO: 248 K/UL (ref 135–420)
PMV BLD AUTO: 9.8 FL (ref 9.2–11.8)
POTASSIUM SERPL-SCNC: 4.2 MMOL/L (ref 3.5–5.5)
RBC # BLD AUTO: 3.95 M/UL (ref 4.7–5.5)
SODIUM SERPL-SCNC: 136 MMOL/L (ref 136–145)
WBC # BLD AUTO: 7.8 K/UL (ref 4.6–13.2)

## 2019-04-14 PROCEDURE — 80048 BASIC METABOLIC PNL TOTAL CA: CPT

## 2019-04-14 PROCEDURE — 74011250637 HC RX REV CODE- 250/637: Performed by: INTERNAL MEDICINE

## 2019-04-14 PROCEDURE — 85027 COMPLETE CBC AUTOMATED: CPT

## 2019-04-14 PROCEDURE — 36415 COLL VENOUS BLD VENIPUNCTURE: CPT

## 2019-04-14 PROCEDURE — 82962 GLUCOSE BLOOD TEST: CPT

## 2019-04-14 PROCEDURE — 74011250636 HC RX REV CODE- 250/636: Performed by: PHYSICIAN ASSISTANT

## 2019-04-14 RX ORDER — PREDNISONE 20 MG/1
40 TABLET ORAL DAILY
Qty: 6 TAB | Refills: 0 | Status: SHIPPED | OUTPATIENT
Start: 2019-04-14 | End: 2020-04-09 | Stop reason: ALTCHOICE

## 2019-04-14 RX ORDER — AMLODIPINE BESYLATE 10 MG/1
10 TABLET ORAL DAILY
Qty: 30 TAB | Refills: 1 | Status: SHIPPED | OUTPATIENT
Start: 2019-04-15 | End: 2019-06-21 | Stop reason: SDUPTHER

## 2019-04-14 RX ADMIN — SODIUM CHLORIDE 10 ML: 9 INJECTION, SOLUTION INTRAMUSCULAR; INTRAVENOUS; SUBCUTANEOUS at 06:12

## 2019-04-14 RX ADMIN — HEPARIN SODIUM 5000 UNITS: 5000 INJECTION INTRAVENOUS; SUBCUTANEOUS at 08:55

## 2019-04-14 RX ADMIN — METHYLPREDNISOLONE SODIUM SUCCINATE 40 MG: 40 INJECTION, POWDER, FOR SOLUTION INTRAMUSCULAR; INTRAVENOUS at 08:56

## 2019-04-14 RX ADMIN — METHYLPREDNISOLONE SODIUM SUCCINATE 40 MG: 40 INJECTION, POWDER, FOR SOLUTION INTRAMUSCULAR; INTRAVENOUS at 06:11

## 2019-04-14 RX ADMIN — FAMOTIDINE 20 MG: 20 TABLET ORAL at 08:55

## 2019-04-14 RX ADMIN — HEPARIN SODIUM 5000 UNITS: 5000 INJECTION INTRAVENOUS; SUBCUTANEOUS at 00:00

## 2019-04-14 RX ADMIN — AMLODIPINE BESYLATE 10 MG: 10 TABLET ORAL at 08:54

## 2019-04-14 NOTE — PROGRESS NOTES
Discharge:    Patient will discharge home today (4/14/2019). Patient has no home health orders in place at this time. Patient's family will transport home at the time of discharge. There are no other care manager concerns regarding this discharge. This writer will continue to closely monitor for discharge planning to ensure a safe discharge home from [facility]. Seg[facility] DuranOhio[facility] HospitalW  Care Manager  Pager#: [phone]

## 2019-04-14 NOTE — DISCHARGE SUMMARY
[facility]  [facility]       Patient: Monica Hull Age: 51 y.o. : 1970 MR#: [id number] SSN: [id number]  PCP on record: None  Admit date: 2019  Discharge date: 2019  -anesthesia  Consults:  -NATVIIDAD Guillen-anesthesiology  -   Procedures: none  -     Significant Diagnostic Studies: none  -    Discharge Diagnoses: Angioedema of lip, right cheek due to ACE inhibitor                                          Patient Active Problem List   Diagnosis Code    Alcohol abuse F10.10    Chest pain R07.9    RBBB with left anterior fascicular block I45.2    SOB (shortness of breath) on exertion R06.02    Essential hypertension with goal blood pressure less than 130/80 I10    Hypercholesteremia E78.00    Angioedema T78. [address] by Problem   51 y o male w/ h/o HTN on lisinopril presented w/ lower lip swelling that started on date of admission. He was noted, in the ED, to have swelling of the lower lip. Pt reported that swelling had been getting worse. No reports of respiratory compromise. About a yr ago he had similar incident w/ swelling of the corner of the lip but he did not seek medical attention at the time. He has been taking lisinopril for about 2 yrs. Pt was started on solumedrol, benadryl, pepcid in the ED and was given FFP, 1 unit. He was evaluated by anesthesia due to concerns for impending airway compromise. Anesthesia did not think he needed any intervention. He was subsequently admitted to the ICU for close monitoring. Pt continued to do well/improve and was transferred out of the ICU. Next day after transfer out of the ICU he was dc'd. He did not c/o any increased swelling, he denied problems w/ swallowing. He has been given instructions not to take ACE inhibitors and ARB's in the future and to let his health care givers know about his reaction to these class of meds.             Today's examination of the patient revealed:     Subjective:     Objective:   VS:   Visit Vitals  /81 (BP 1 Location: Left arm, BP Patient Position: Sitting)   Pulse 76   Temp 98.3 °F (36.8 °C)   Resp 18   Ht 5' 7\" (1.702 m)   Wt 82.3 kg (181 lb 7 oz)   SpO2 95%   BMI 28.42 kg/m²      Tmax/24hrs: Temp (24hrs), Av.3 °F (36.8 °C), Min:97.9 °F (36.6 °C), Max:98.7 °F (37.1 °C)     Input/Output:     Intake/Output Summary (Last 24 hours) at 2019 1159  Last data filed at 2019 1723  Gross per 24 hour   Intake 600 ml   Output --   Net 600 ml       General:  Alert, awake, in nad  Cardiovascular:  Rrr, no murmurs  Pulmonary:  ctab  GI: soft, nt, nd   Extremities: ho edema   Additional:      Labs:    Recent Results (from the past 24 hour(s))   GLUCOSE, POC    Collection Time: 19 12:04 PM   Result Value Ref Range    Glucose (POC) 116 (H) 70 - 110 mg/dL   GLUCOSE, POC    Collection Time: 19  4:10 PM   Result Value Ref Range    Glucose (POC) 147 (H) 70 - 110 mg/dL   GLUCOSE, POC    Collection Time: 19  6:05 PM   Result Value Ref Range    Glucose (POC) 155 (H) 70 - 110 mg/dL   GLUCOSE, POC    Collection Time: 19  9:30 PM   Result Value Ref Range    Glucose (POC) 146 (H) 70 - 110 mg/dL   CBC W/O DIFF    Collection Time: 19  4:01 AM   Result Value Ref Range    WBC 7.8 4.6 - 13.2 K/uL    RBC 3.95 (L) 4.70 - 5.50 M/uL    HGB 13.0 13.0 - 16.0 g/dL    HCT 37.8 36.0 - 48.0 %    MCV 95.7 74.0 - 97.0 FL    MCH 32.9 24.0 - 34.0 PG    MCHC 34.4 31.0 - 37.0 g/dL    RDW 12.7 11.6 - 14.5 %    PLATELET 783 716 - 658 K/uL    MPV 9.8 9.2 - 61.1 FL   METABOLIC PANEL, BASIC    Collection Time: 19  4:01 AM   Result Value Ref Range    Sodium 136 136 - 145 mmol/L    Potassium 4.2 3.5 - 5.5 mmol/L    Chloride 105 100 - 108 mmol/L    CO2 26 21 - 32 mmol/L    Anion gap 5 3.0 - 18 mmol/L    Glucose 143 (H) 74 - 99 mg/dL    BUN 21 (H) 7.0 - 18 MG/DL    Creatinine 1.03 0.6 - 1.3 MG/DL    BUN/Creatinine ratio 20 12 - 20      GFR est AA >60 >60 ml/min/1.73m2    GFR est non-AA >60 >60 ml/min/1.73m2    Calcium 9.3 8.5 - 10.1 MG/DL   GLUCOSE, POC    Collection Time: 04/14/19  6:43 AM   Result Value Ref Range    Glucose (POC) 139 (H) 70 - 110 mg/dL     Additional Data Reviewed:     Condition: stable  Disposition:    [x]Home   []Home with Home Health   []SNF/NH   []Rehab   []Home with family   []Alternate Facility:____________________      Discharge Medications:     Current Discharge Medication List      START taking these medications    Details   predniSONE (DELTASONE) 20 mg tablet Take 40 mg by mouth daily. Take 2 pills daily for 2 days then 1 pill daily for 2 days  Qty: 6 Tab, Refills: 0         CONTINUE these medications which have CHANGED    Details   amLODIPine (NORVASC) 10 mg tablet Take 1 Tab by mouth daily. Qty: 30 Tab, Refills: 1         CONTINUE these medications which have NOT CHANGED    Details   fenofibrate (LOFIBRA) 160 mg tablet TAKE ONE TABLET BY MOUTH DAILY  Qty: 30 Tab, Refills: 05    Associated Diagnoses: Hyperlipidemia, unspecified hyperlipidemia type         STOP taking these medications       lisinopril (PRINIVIL, ZESTRIL) 40 mg tablet Comments:   Reason for Stopping: Follow-up Appointments:   1.  Your PCP: None, within 7-10days    >30 minutes spent coordinating this discharge (review instructions/follow-up, prescriptions, preparing report for sign off)    Signed:  Eliseo Gonzalez MD  4/14/2019  11:59 AM

## 2019-04-14 NOTE — ROUTINE PROCESS
As part of the discharge instructions, medications already given today were discussed with the patient. The next dose due of all ordered meds was highlighted as part of the medication discharge instructions. Discussed with the patient the importance of taking medications as directed, as well as the side effects and adverse reactions to medications ordered. Patient home medications check for dosage amount -- per Dr. Gisselle Ceja orders and information on angio edema information given in discharge instructions and explained -- medication changes and side effects explained and teach back used with him-- patient understand he needs an appointment with Dr. So YOUNG for after hospitalization follow up--     I v access removed -- no issues  He was wheel to emergency entrance because his vehicle was in parking lot there.

## 2019-04-14 NOTE — DISCHARGE INSTRUCTIONS
As part of the discharge instructions, medications already given today were discussed with the patient. The next dose due of all ordered meds was highlighted as part of the medication discharge instructions. Discussed with the patient the importance of taking medications as directed, as well as the side effects and adverse reactions to medications ordered. Sparrow Activation    Thank you for requesting access to Sparrow. Please follow the instructions below to securely access and download your online medical record. Sparrow allows you to send messages to your doctor, view your test results, renew your prescriptions, schedule appointments, and more. How Do I Sign Up? 1. In your internet browser, go to [email or web address]  2. Click on the First Time User? Click Here link in the Sign In box. You will be redirect to the New Member Sign Up page. 3. Enter your Sparrow Access Code exactly as it appears below. You will not need to use this code after youve completed the sign-up process. If you do not sign up before the expiration date, you must request a new code. Sparrow Access Code: Activation code not generated  Current Sparrow Status: Patient Declined (This is the date your Sparrow access code will )    4. Enter the last four digits of your Social Security Number (xxxx) and Date of Birth (mm/dd/yyyy) as indicated and click Submit. You will be taken to the next sign-up page. 5. Create a Sparrow ID. This will be your Sparrow login ID and cannot be changed, so think of one that is secure and easy to remember. 6. Create a Sparrow password. You can change your password at any time. 7. Enter your Password Reset Question and Answer. This can be used at a later time if you forget your password. 8. Enter your e-mail address. You will receive e-mail notification when new information is available in [address] 9. Click Sign Up.  You can now view and download portions of your medical record. 10. Click the Download Summary menu link to download a portable copy of your medical information. Additional Information    If you have questions, please visit the Frequently Asked Questions section of the [employer] website at [email or web address] Remember, [employer] is NOT to be used for urgent needs. For medical emergencies, dial 911. Patient armband removed and shredded    DISCHARGE SUMMARY from Nurse    PATIENT INSTRUCTIONS:    After general anesthesia or intravenous sedation, for 24 hours or while taking prescription Narcotics:  · Limit your activities  · Do not drive and operate hazardous machinery  · Do not make important personal or business decisions  · Do  not drink alcoholic beverages  · If you have not urinated within 8 hours after discharge, please contact your surgeon on call. Report the following to your surgeon:  · Excessive pain, swelling, redness or odor of or around the surgical area  · Temperature over 100.5  · Nausea and vomiting lasting longer than 4 hours or if unable to take medications  · Any signs of decreased circulation or nerve impairment to extremity: change in color, persistent  numbness, tingling, coldness or increase pain  · Any questions    What to do at Home:  Recommended activity: Activity as tolerated,     If you experience any of the following symptoms chest pain, shortness of breath or any concerning symptoms, please follow up with primary care provider or emergency department. *  Please give a list of your current medications to your Primary Care Provider. *  Please update this list whenever your medications are discontinued, doses are      changed, or new medications (including over-the-counter products) are added. *  Please carry medication information at all times in case of emergency situations.     These are general instructions for a healthy lifestyle:    No smoking/ No tobacco products/ Avoid exposure to second hand smoke  Surgeon General's Warning:  Quitting smoking now greatly reduces serious risk to your health. Obesity, smoking, and sedentary lifestyle greatly increases your risk for illness    A healthy diet, regular physical exercise & weight monitoring are important for maintaining a healthy lifestyle    You may be retaining fluid if you have a history of heart failure or if you experience any of the following symptoms:  Weight gain of 3 pounds or more overnight or 5 pounds in a week, increased swelling in our hands or feet or shortness of breath while lying flat in bed. Please call your doctor as soon as you notice any of these symptoms; do not wait until your next office visit. Recognize signs and symptoms of STROKE:    F-face looks uneven    A-arms unable to move or move unevenly    S-speech slurred or non-existent    T-time-call 911 as soon as signs and symptoms begin-DO NOT go       Back to bed or wait to see if you get better-TIME IS BRAIN. Warning Signs of HEART ATTACK     Call 911 if you have these symptoms:   Chest discomfort. Most heart attacks involve discomfort in the center of the chest that lasts more than a few minutes, or that goes away and comes back. It can feel like uncomfortable pressure, squeezing, fullness, or pain.  Discomfort in other areas of the upper body. Symptoms can include pain or discomfort in one or both arms, the back, neck, jaw, or stomach.  Shortness of breath with or without chest discomfort.  Other signs may include breaking out in a cold sweat, nausea, or lightheadedness. Don't wait more than five minutes to call 911 - MINUTES MATTER! Fast action can save your life. Calling 911 is almost always the fastest way to get lifesaving treatment. Emergency Medical Services staff can begin treatment when they arrive -- up to an hour sooner than if someone gets to the hospital by car. The discharge information has been reviewed with the patient.   The patient verbalized understanding. Discharge medications reviewed with the patient and appropriate educational materials and side effects teaching were provided.   ___________________________________________________________________________________________________________________________________

## 2019-04-16 ENCOUNTER — TELEPHONE (OUTPATIENT)
Dept: CARDIOLOGY CLINIC | Age: 49
End: 2019-04-16

## 2019-04-16 LAB — C1Q AG SERPL-SCNC: 4.7 UG EQ/ML

## 2019-04-16 NOTE — TELEPHONE ENCOUNTER
Pt went to  ER for swollen lips and was told allergic reaction to Lisinopril. The Lisinopril was D/C and his Amlodipine was increased to 10mg daily. Please advise.

## 2019-05-03 DIAGNOSIS — I10 ESSENTIAL HYPERTENSION: Primary | ICD-10-CM

## 2019-05-03 RX ORDER — HYDROCHLOROTHIAZIDE 12.5 MG/1
12.5 TABLET ORAL DAILY
Qty: 30 TAB | Refills: 6 | Status: SHIPPED | OUTPATIENT
Start: 2019-05-03 | End: 2019-11-26 | Stop reason: SDUPTHER

## 2019-05-03 NOTE — TELEPHONE ENCOUNTER
Per Audie Nguyễn NP after reviewing BP log from 4/17/19 to 4/28/19:    Start HCTZ 12.5 mg daily  BMP x 1 week  BP and HR x 1 week     Called and spoke with patient. Patient states understanding and will bring BP log to office after 1 week.

## 2019-05-22 ENCOUNTER — HOSPITAL ENCOUNTER (OUTPATIENT)
Dept: LAB | Age: 49
Discharge: HOME | End: 2019-05-22
Payer: SELF-PAY

## 2019-05-22 DIAGNOSIS — I10 ESSENTIAL HYPERTENSION: ICD-10-CM

## 2019-05-22 LAB
ANION GAP SERPL CALC-SCNC: 5 MMOL/L (ref 3–18)
BUN SERPL-MCNC: 13 MG/DL (ref 7–18)
BUN/CREAT SERPL: 13 (ref 12–20)
CALCIUM SERPL-MCNC: 9.5 MG/DL (ref 8.5–10.1)
CHLORIDE SERPL-SCNC: 106 MMOL/L (ref 100–108)
CO2 SERPL-SCNC: 31 MMOL/L (ref 21–32)
CREAT SERPL-MCNC: 0.99 MG/DL (ref 0.6–1.3)
GLUCOSE SERPL-MCNC: 88 MG/DL (ref 74–99)
POTASSIUM SERPL-SCNC: 3.5 MMOL/L (ref 3.5–5.5)
SODIUM SERPL-SCNC: 142 MMOL/L (ref 136–145)

## 2019-05-22 PROCEDURE — 80048 BASIC METABOLIC PNL TOTAL CA: CPT

## 2019-05-22 PROCEDURE — 36415 COLL VENOUS BLD VENIPUNCTURE: CPT

## 2019-06-21 DIAGNOSIS — E78.5 HYPERLIPIDEMIA, UNSPECIFIED HYPERLIPIDEMIA TYPE: ICD-10-CM

## 2019-06-21 RX ORDER — AMLODIPINE BESYLATE 10 MG/1
10 TABLET ORAL DAILY
Qty: 30 TAB | Refills: 6 | Status: SHIPPED | OUTPATIENT
Start: 2019-06-21 | End: 2020-01-22

## 2019-06-21 RX ORDER — FENOFIBRATE 160 MG/1
TABLET ORAL
Qty: 30 TAB | Refills: 4 | Status: SHIPPED | OUTPATIENT
Start: 2019-06-21 | End: 2019-11-22 | Stop reason: SDUPTHER

## 2019-11-22 DIAGNOSIS — E78.5 HYPERLIPIDEMIA, UNSPECIFIED HYPERLIPIDEMIA TYPE: ICD-10-CM

## 2019-11-22 RX ORDER — FENOFIBRATE 160 MG/1
TABLET ORAL
Qty: 30 TAB | Refills: 3 | Status: SHIPPED | OUTPATIENT
Start: 2019-11-22 | End: 2020-03-18

## 2019-11-26 DIAGNOSIS — I10 ESSENTIAL HYPERTENSION: ICD-10-CM

## 2019-11-26 RX ORDER — HYDROCHLOROTHIAZIDE 12.5 MG/1
TABLET ORAL
Qty: 30 TAB | Refills: 5 | Status: SHIPPED | OUTPATIENT
Start: 2019-11-26 | End: 2020-04-09 | Stop reason: SDUPTHER

## 2020-01-22 RX ORDER — AMLODIPINE BESYLATE 10 MG/1
TABLET ORAL
Qty: 30 TAB | Refills: 5 | Status: SHIPPED | OUTPATIENT
Start: 2020-01-22 | End: 2020-07-20

## 2020-03-18 DIAGNOSIS — E78.5 HYPERLIPIDEMIA, UNSPECIFIED HYPERLIPIDEMIA TYPE: ICD-10-CM

## 2020-03-18 RX ORDER — FENOFIBRATE 160 MG/1
TABLET ORAL
Qty: 30 TAB | Refills: 2 | Status: SHIPPED | OUTPATIENT
Start: 2020-03-18 | End: 2020-07-07

## 2020-04-09 ENCOUNTER — VIRTUAL VISIT (OUTPATIENT)
Dept: CARDIOLOGY CLINIC | Age: 50
End: 2020-04-09

## 2020-04-09 VITALS
BODY MASS INDEX: 31.08 KG/M2 | DIASTOLIC BLOOD PRESSURE: 106 MMHG | WEIGHT: 198 LBS | HEIGHT: 67 IN | SYSTOLIC BLOOD PRESSURE: 153 MMHG | HEART RATE: 74 BPM

## 2020-04-09 DIAGNOSIS — I45.2 RBBB WITH LEFT ANTERIOR FASCICULAR BLOCK: ICD-10-CM

## 2020-04-09 DIAGNOSIS — E78.00 HYPERCHOLESTEREMIA: Primary | ICD-10-CM

## 2020-04-09 DIAGNOSIS — F10.10 ALCOHOL ABUSE: ICD-10-CM

## 2020-04-09 DIAGNOSIS — I10 ESSENTIAL HYPERTENSION: ICD-10-CM

## 2020-04-09 RX ORDER — HYDROCHLOROTHIAZIDE 12.5 MG/1
TABLET ORAL
Qty: 30 TAB | Refills: 5 | Status: SHIPPED | OUTPATIENT
Start: 2020-04-09 | End: 2020-11-17

## 2020-04-09 RX ORDER — METOPROLOL TARTRATE 25 MG/1
25 TABLET, FILM COATED ORAL 2 TIMES DAILY
Qty: 60 TAB | Refills: 3 | Status: SHIPPED | OUTPATIENT
Start: 2020-04-09 | End: 2020-08-27

## 2020-04-09 NOTE — PROGRESS NOTES
Blood sugars improving significantly.  He was 152 this morning down from 200-240 the first 4 days he started checking his blood sugar.    -He has looked online and made some dietary changes.  -He has his first visit with diabetic education coming up here.  -No additional changes in his medication  -I suspect he will continue to drift down and likely with that her education and diet may be able to be controlled with metformin only.  -Follow-up with   HISTORY OF PRESENT ILLNESS  Ishan Ewing is a 47 y.o. male. Ishan Ewing is a 47 y.o. male who was seen by synchronous (real-time) audio-video technology on 4/9/2020. Consent:  He and/or his healthcare decision maker is aware that this patient-initiated Telehealth encounter is a billable service, with coverage as determined by his insurance carrier. He is aware that he may receive a bill and has provided verbal consent to proceed: Yes    I was in the office while conducting this encounter. 5/17 d/nkechi fenofibrate dur to rectal odor without incontinence  11/16 thinks that he is getting mild bronchitis for last few days- improving  10/18 - Doing well, no complaints at this time. Denies chest pain, sob, orthopnea, edema, palpitations or dizziness. Hypertension   The history is provided by the medical records. Pertinent negatives include no chest pain, no headaches and no shortness of breath. Cholesterol Problem   The history is provided by the medical records. Pertinent negatives include no chest pain, no headaches and no shortness of breath. Review of Systems   Constitutional: Negative for chills, diaphoresis, fever, malaise/fatigue and weight loss. HENT: Negative for nosebleeds. Eyes: Negative for discharge. Respiratory: Negative for cough, shortness of breath and wheezing. Cardiovascular: Negative for chest pain, palpitations, orthopnea, claudication, leg swelling and PND. Gastrointestinal: Negative for diarrhea, nausea and vomiting. Genitourinary: Negative for dysuria and hematuria. Musculoskeletal: Negative for joint pain. Skin: Negative for rash. Neurological: Negative for dizziness, seizures, loss of consciousness and headaches. Endo/Heme/Allergies: Negative for polydipsia. Does not bruise/bleed easily. Psychiatric/Behavioral: Negative for depression and substance abuse. The patient does not have insomnia.       Allergies   Allergen Reactions    Ace Inhibitors Angioedema    Lisinopril Other (comments)     Lip swelling    Pcn [Penicillins] Rash       Past Medical History:   Diagnosis Date    Essential hypertension with goal blood pressure less than 130/80 2016    Hyperlipemia 2016       Family History   Problem Relation Age of Onset    Heart Attack Neg Hx     Stroke Neg Hx        Social History     Tobacco Use    Smoking status: Former Smoker     Types: Pipe     Last attempt to quit: 2002     Years since quittin.8    Smokeless tobacco: Never Used   Substance Use Topics    Alcohol use: Yes     Alcohol/week: 78.0 standard drinks     Types: 14 Cans of beer, 28 Shots of liquor, 36 Standard drinks or equivalent per week     Frequency: 4 or more times a week     Drinks per session: 3 or 4     Binge frequency: Weekly     Comment: moderate use 2 beers per day and 4 mixed drinks     Drug use: No        Current Outpatient Medications   Medication Sig    fenofibrate (LOFIBRA) 160 mg tablet TAKE ONE TABLET BY MOUTH DAILY    amLODIPine (NORVASC) 10 mg tablet TAKE ONE TABLET BY MOUTH DAILY    hydroCHLOROthiazide (HYDRODIURIL) 12.5 mg tablet TAKE ONE TABLET BY MOUTH DAILY     No current facility-administered medications for this visit. History reviewed. No pertinent surgical history. Diagnostic Studies: Old records reviewed and show as follows  EKG tracings reviewed by me today. CARDIOLOGY STUDIES 2016   EKG Result SR, LAHB/RBBB   Some recent data might be hidden    ECHO  SUMMARY:  Left ventricle: Systolic function was normal. Ejection fraction was  estimated in the range of 55 % to 60 %. There were no regional wall motion  abnormalities. Wall thickness was mildly increased. Right ventricle: The ventricle was mildly dilated. Mitral valve: There was trivial regurgitation. Tricuspid valve: Tricuspid regurgitation peak velocity: 2 m/sec. Pulmonary  artery systolic pressure: 19 mmHg.  TMT  Conclusion:    1.  No ischemic ST-T changes at 100% of predicted maximal heart rate.     2. Normal functional capacity.    3. Appropriate heart rate and moderately hypertensive blood pressure response with baseline hypertension.     4. The patient will require medications for hypertension and Lisinopril 10 mg a day was given today.  Follow up BMP.  Side effects were explained including cough.     5. Clinical correlation is suggested    Visit Vitals  BP (!) 153/106 (BP 1 Location: Right arm, BP Patient Position: Sitting)   Pulse 74   Ht 5' 7\" (1.702 m)   Wt 89.8 kg (198 lb)   BMI 31.01 kg/m²       Mr. Beti Rasmussen has a reminder for a \"due or due soon\" health maintenance. I have asked that he contact his primary care provider for follow-up on this health maintenance. Physical Exam   Constitutional: He is oriented to person, place, and time. He appears well-developed and well-nourished. No distress. HENT:   Head: Normocephalic and atraumatic. Mouth/Throat: Normal dentition. Eyes: Right eye exhibits no discharge. Left eye exhibits no discharge. No scleral icterus. Neck: Neck supple. No JVD present. Carotid bruit is not present. No thyromegaly present. Cardiovascular: Normal rate, regular rhythm, S1 normal, S2 normal, normal heart sounds and intact distal pulses. Exam reveals no gallop and no friction rub. No murmur heard. Pulmonary/Chest: Effort normal and breath sounds normal. He has no wheezes. He has no rales. Abdominal: Soft. He exhibits no mass. There is no abdominal tenderness. Musculoskeletal:         General: No edema. Lymphadenopathy:        Right cervical: No superficial cervical adenopathy present. Left cervical: No superficial cervical adenopathy present. Neurological: He is alert and oriented to person, place, and time. Skin: Skin is warm and dry. No rash noted. Psychiatric: He has a normal mood and affect.  His behavior is normal.       ASSESSMENT and PLAN    Patient advised to take alcohol in moderation to avoid future cardiac and liver problems including arrhythmias, cardiomyopathy and congestive heart failure. HLD: Results for Molly Rahman (MRN [id number]) as of 4/11/2019 08:48   Ref. Range 5/8/2017 07:03 3/15/2018 13:10 12/10/2018 07:48   Triglyceride Latest Ref Range: <150 MG/DL 1,133 (H)  155 (H)   Cholesterol, total Latest Ref Range: <200 MG/ (H)  234 (H)   HDL Cholesterol Latest Ref Range: 40 - 60 MG/DL 40  78 (H)   CHOL/HDL Ratio Latest Ref Range: 0 - 5.0   8.3 (H)  3.0   LDL, calculated Latest Ref Range: 0 - 100 MG/DL LDL AND VLDL CHOL. .. 125 (H)   VLDL, calculated Latest Units: MG/DL Calculation not v...  31     Patient continues to drink heavy amounts of alcohol. He realizes that it will be harmful for his health and likely contributing to his hypertension as well. Add low-dose beta-blockers and follow the home chart. Check the labs as ordered after about 3 months and hopefully coronavirus risk is over at that time. Diagnoses and all orders for this visit:    1. Hypercholesteremia    2. Essential hypertension  -     hydroCHLOROthiazide (HYDRODIURIL) 12.5 mg tablet; TAKE ONE TABLET BY MOUTH DAILY  -     metoprolol tartrate (LOPRESSOR) 25 mg tablet; Take 1 Tab by mouth two (2) times a day. -     METABOLIC PANEL, BASIC; Future  -     LIPID PANEL; Future  -     HEPATIC FUNCTION PANEL; Future    3. Alcohol abuse    4. RBBB with left anterior fascicular block        Pertinent laboratory and test data reviewed and discussed with patient.   See patient instructions also for other medical advice given    Medications Discontinued During This Encounter   Medication Reason    predniSONE (DELTASONE) 20 mg tablet Therapy Completed    hydroCHLOROthiazide (HYDRODIURIL) 12.5 mg tablet Reorder       Follow-up and Dispositions    · Return in about 6 months (around 10/9/2020), or if symptoms worsen or fail to improve, for post test.         Vital Signs: (As obtained by patient/caregiver at home)  Visit Vitals  BP (!) 153/106 (BP 1 Location: Right arm, BP Patient Position: Sitting)   Pulse 74   Ht 5' 7\" (1.702 m)   Wt 89.8 kg (198 lb)   BMI 31.01 kg/m²          Other pertinent observable physical exam findings:-      We discussed the expected course, resolution and complications of the diagnosis(es) in detail. Medication risks, benefits, costs, interactions, and alternatives were discussed as indicated. I advised him to contact the office if his condition worsens, changes or fails to improve as anticipated. He expressed understanding with the diagnosis(es) and plan. Pursuant to the emergency declaration under the [facility], [facility] waiver authority and the [employer] General Act, this Virtual  Visit was conducted, with patient's consent, to reduce the patient's risk of exposure to COVID-19 and provide continuity of care for an established patient. Services were provided through a video synchronous discussion virtually to substitute for in-person clinic visit.     Rigo Cruz MD

## 2020-04-09 NOTE — PATIENT INSTRUCTIONS
Medications Discontinued During This Encounter Medication Reason  predniSONE (DELTASONE) 20 mg tablet Therapy Completed  hydroCHLOROthiazide (HYDRODIURIL) 12.5 mg tablet Reorder After the recommended changes have been made in blood pressure medicines, patient advised to keep BP/HR(pulse rate) chart twice daily and bring us results in next 1 week or so. Patient may send the results via \"My Chart\" if desired. Please rest for 5-10 minutes before checking blood pressure. Sit on a comfortable chair without crossing the legs and put your arm on a table. We recommend that you use an upper arm cuff. Check the blood pressure 3 times weekly and take the lowest reading. If you check the blood pressure in both arms, use the higher reading. High Blood Pressure: Care Instructions Overview It's normal for blood pressure to go up and down throughout the day. But if it stays up, you have high blood pressure. Another name for high blood pressure is hypertension. Despite what a lot of people think, high blood pressure usually doesn't cause headaches or make you feel dizzy or lightheaded. It usually has no symptoms. But it does increase your risk of stroke, heart attack, and other problems. You and your doctor will talk about your risks of these problems based on your blood pressure. Your doctor will give you a goal for your blood pressure. Your goal will be based on your health and your age. Lifestyle changes, such as eating healthy and being active, are always important to help lower blood pressure. You might also take medicine to reach your blood pressure goal. 
Follow-up care is a key part of your treatment and safety. Be sure to make and go to all appointments, and call your doctor if you are having problems. It's also a good idea to know your test results and keep a list of the medicines you take. How can you care for yourself at home? Medical treatment · If you stop taking your medicine, your blood pressure will go back up. You may take one or more types of medicine to lower your blood pressure. Be safe with medicines. Take your medicine exactly as prescribed. Call your doctor if you think you are having a problem with your medicine. · Talk to your doctor before you start taking aspirin every day. Aspirin can help certain people lower their risk of a heart attack or stroke. But taking aspirin isn't right for everyone, because it can cause serious bleeding. · See your doctor regularly. You may need to see the doctor more often at first or until your blood pressure comes down. · If you are taking blood pressure medicine, talk to your doctor before you take decongestants or anti-inflammatory medicine, such as ibuprofen. Some of these medicines can raise blood pressure. · Learn how to check your blood pressure at home. Lifestyle changes · Stay at a healthy weight. This is especially important if you put on weight around the waist. Losing even 10 pounds can help you lower your blood pressure. · If your doctor recommends it, get more exercise. Walking is a good choice. Bit by bit, increase the amount you walk every day. Try for at least 30 minutes on most days of the week. You also may want to swim, bike, or do other activities. · Avoid or limit alcohol. Talk to your doctor about whether you can drink any alcohol. · Try to limit how much sodium you eat to less than 2,300 milligrams (mg) a day. Your doctor may ask you to try to eat less than 1,500 mg a day. · Eat plenty of fruits (such as bananas and oranges), vegetables, legumes, whole grains, and low-fat dairy products. · Lower the amount of saturated fat in your diet. Saturated fat is found in animal products such as milk, cheese, and meat. Limiting these foods may help you lose weight and also lower your risk for heart disease. · Do not smoke. Smoking increases your risk for heart attack and stroke. If you need help quitting, talk to your doctor about stop-smoking programs and medicines. These can increase your chances of quitting for good. When should you call for help? Call  911 anytime you think you may need emergency care. This may mean having symptoms that suggest that your blood pressure is causing a serious heart or blood vessel problem. Your blood pressure may be over 180/120. 
 For example, call  911 if: 
  · You have symptoms of a heart attack. These may include: 
? Chest pain or pressure, or a strange feeling in the chest. 
? Sweating. ? Shortness of breath. ? Nausea or vomiting. ? Pain, pressure, or a strange feeling in the back, neck, jaw, or upper belly or in one or both shoulders or arms. ? Lightheadedness or sudden weakness. ? A fast or irregular heartbeat.  
  · You have symptoms of a stroke. These may include: 
? Sudden numbness, tingling, weakness, or loss of movement in your face, arm, or leg, especially on only one side of your body. ? Sudden vision changes. ? Sudden trouble speaking. ? Sudden confusion or trouble understanding simple statements. ? Sudden problems with walking or balance. ? A sudden, severe headache that is different from past headaches.  
  · You have severe back or belly pain.  
 Do not wait until your blood pressure comes down on its own. Get help right away. 
 Call your doctor now or seek immediate care if: 
  · Your blood pressure is much higher than normal (such as 180/120 or higher), but you don't have symptoms.  
  · You think high blood pressure is causing symptoms, such as: 
? Severe headache. 
? Blurry vision.  
 Watch closely for changes in your health, and be sure to contact your doctor if: 
  · Your blood pressure measures higher than your doctor recommends at least 2 times. That means the top number is higher or the bottom number is higher, or both.  
  · You think you may be having side effects from your blood pressure medicine. Where can you learn more? Go to http://balta-kevin.info/ Enter L582 in the search box to learn more about \"High Blood Pressure: Care Instructions. \" Current as of: December 15, 2019Content Version: 12.4 © 1198-2728 [employer]. Care instructions adapted under license by [employer] (which disclaims liability or warranty for this information). If you have questions about a medical condition or this instruction, always ask your healthcare professional. [address] any warranty or liability for your use of this information.

## 2020-07-07 DIAGNOSIS — E78.5 HYPERLIPIDEMIA, UNSPECIFIED HYPERLIPIDEMIA TYPE: ICD-10-CM

## 2020-07-07 RX ORDER — FENOFIBRATE 160 MG/1
TABLET ORAL
Qty: 30 TAB | Refills: 1 | Status: SHIPPED | OUTPATIENT
Start: 2020-07-07 | End: 2020-09-18

## 2020-07-20 RX ORDER — AMLODIPINE BESYLATE 10 MG/1
TABLET ORAL
Qty: 30 TAB | Refills: 4 | Status: SHIPPED | OUTPATIENT
Start: 2020-07-20 | End: 2020-12-18

## 2020-08-27 DIAGNOSIS — I10 ESSENTIAL HYPERTENSION: ICD-10-CM

## 2020-08-27 RX ORDER — METOPROLOL TARTRATE 25 MG/1
TABLET, FILM COATED ORAL
Qty: 60 TAB | Refills: 2 | Status: SHIPPED | OUTPATIENT
Start: 2020-08-27 | End: 2020-12-04

## 2020-09-18 DIAGNOSIS — E78.5 HYPERLIPIDEMIA, UNSPECIFIED HYPERLIPIDEMIA TYPE: ICD-10-CM

## 2020-09-18 RX ORDER — FENOFIBRATE 160 MG/1
TABLET ORAL
Qty: 30 TAB | Refills: 0 | Status: SHIPPED | OUTPATIENT
Start: 2020-09-18 | End: 2020-10-29

## 2020-10-22 ENCOUNTER — OFFICE VISIT (OUTPATIENT)
Dept: CARDIOLOGY CLINIC | Age: 50
End: 2020-10-22
Payer: MEDICAID

## 2020-10-22 VITALS
WEIGHT: 199.2 LBS | HEIGHT: 67 IN | OXYGEN SATURATION: 97 % | DIASTOLIC BLOOD PRESSURE: 75 MMHG | HEART RATE: 67 BPM | BODY MASS INDEX: 31.27 KG/M2 | TEMPERATURE: 97.9 F | SYSTOLIC BLOOD PRESSURE: 133 MMHG

## 2020-10-22 DIAGNOSIS — F10.10 ALCOHOL ABUSE: ICD-10-CM

## 2020-10-22 DIAGNOSIS — I50.9 ACUTE CONGESTIVE HEART FAILURE, UNSPECIFIED HEART FAILURE TYPE (HCC): ICD-10-CM

## 2020-10-22 DIAGNOSIS — I10 ESSENTIAL HYPERTENSION: Primary | ICD-10-CM

## 2020-10-22 DIAGNOSIS — E78.00 HYPERCHOLESTEREMIA: ICD-10-CM

## 2020-10-22 PROCEDURE — 99214 OFFICE O/P EST MOD 30 MIN: CPT | Performed by: INTERNAL MEDICINE

## 2020-10-22 NOTE — PATIENT INSTRUCTIONS
There are no discontinued medications. Avoiding Triggers With Heart Failure: Care Instructions  Your Care Instructions     Triggers are anything that make your heart failure flare up. A flare-up is also called \"sudden heart failure\" or \"acute heart failure. \" When you have a flare-up, fluid builds up in your lungs, and you have problems breathing. You might need to go to the hospital. By watching for changes in your condition and avoiding triggers, you can prevent heart failure flare-ups. Follow-up care is a key part of your treatment and safety. Be sure to make and go to all appointments, and call your doctor if you are having problems. It's also a good idea to know your test results and keep a list of the medicines you take. How can you care for yourself at home? Watch for changes in your weight and condition  · Weigh yourself without clothing at the same time each day. Record your weight. Call your doctor if you have sudden weight gain, such as more than 2 to 3 pounds in a day or 5 pounds in a week. (Your doctor may suggest a different range of weight gain.) A sudden weight gain may mean that your heart failure is getting worse. · Keep a daily record of your symptoms. Write down any changes in how you feel, such as new shortness of breath, cough, or problems eating. Also record if your ankles are more swollen than usual and if you feel more tired than usual. Note anything that you ate or did that could have triggered these changes. Limit sodium  Sodium causes your body to hold on to extra water. This may cause your heart failure symptoms to get worse. People get most of their sodium from processed foods. Fast food and restaurant meals also tend to be very high in sodium. · Your doctor may suggest that you limit sodium. Your doctor can tell you how much sodium is right for you. This includes limiting sodium in cooked and packaged foods. · Read food labels on cans and food packages.  They tell you how much sodium you get in one serving. Check the serving size. If you eat more than one serving, you are getting more sodium. · Be aware that sodium can come in forms other than salt, including monosodium glutamate (MSG), sodium citrate, and sodium bicarbonate (baking soda). MSG is often added to Asian food. You can sometimes ask for food without MSG or salt. · Slowly reducing salt will help you adjust to the taste. Take the salt shaker off the table. · Flavor your food with garlic, lemon juice, onion, vinegar, herbs, and spices instead of salt. Do not use soy sauce, steak sauce, onion salt, garlic salt, mustard, or ketchup on your food, unless it is labeled \"low-sodium\" or \"low-salt. \"  · Make your own salad dressings, sauces, and ketchup without adding salt. · Use fresh or frozen ingredients, instead of canned ones, whenever you can. Choose low-sodium canned goods. · Eat less processed food and food from restaurants, including fast food. Exercise as directed  Moderate, regular exercise is very good for your heart. It improves your blood flow and helps control your weight. But too much exercise can stress your heart and cause a heart failure flare-up. · Check with your doctor before you start an exercise program.  · Walking is an easy way to get exercise. Start out slowly. Gradually increase the length and pace of your walk. Swimming, riding a bike, and using a treadmill are also good forms of exercise. · When you exercise, watch for signs that your heart is working too hard. You are pushing yourself too hard if you cannot talk while you are exercising. If you become short of breath or dizzy or have chest pain, stop, sit down, and rest.  · Do not exercise when you do not feel well. Take medicines correctly  · Take your medicines exactly as prescribed. Call your doctor if you think you are having a problem with your medicine. · Make a list of all the medicines you take.  Include those prescribed to you by other doctors and any over-the-counter medicines, vitamins, or supplements you take. Take this list with you when you go to any doctor. · Take your medicines at the same time every day. It may help you to post a list of all the medicines you take every day and what time of day you take them. · Make taking your medicine as simple as you can. Plan times to take your medicines when you are doing other things, such as eating a meal or getting ready for bed. This will make it easier to remember to take your medicines. · Get organized. Use helpful tools, such as daily or weekly pill containers. When should you call for help? Call 911 if you have symptoms of sudden heart failure such as:    · You have severe trouble breathing.     · You cough up pink, foamy mucus.     · You have a new irregular or rapid heartbeat. Call your doctor now or seek immediate medical care if:    · You have new or increased shortness of breath.     · You are dizzy or lightheaded, or you feel like you may faint.     · You have sudden weight gain, such as more than 2 to 3 pounds in a day or 5 pounds in a week. (Your doctor may suggest a different range of weight gain.)     · You have increased swelling in your legs, ankles, or feet.     · You are suddenly so tired or weak that you cannot do your usual activities. Watch closely for changes in your health, and be sure to contact your doctor if you develop new symptoms. Where can you learn more? Go to http://balta-kevin.info/  Enter V089 in the search box to learn more about \"Avoiding Triggers With Heart Failure: Care Instructions. \"  Current as of: December 16, 2019               Content Version: 12.6  © 5587-3927 [employer]. Care instructions adapted under license by [employer] (which disclaims liability or warranty for this information).  If you have questions about a medical condition or this instruction, always ask your healthcare professional. [employer] disclaims any warranty or liability for your use of this information.

## 2020-10-22 NOTE — PROGRESS NOTES
1. Have you been to the ER, urgent care clinic since your last visit? Hospitalized since your last visit? No    2. Have you seen or consulted any other health care providers outside of the [address] [place] [address] since your last visit? Include any pap smears or colon screening. No     3. Since your last visit, have you had any of the following symptoms?      swelling in legs/arms. Explain: Bilateral foot swelling    4. Have you had any blood work, X-rays or cardiac testing? No    5. Where do you normally have your labs drawn? [facility]    6. Do you need any refills today?    No

## 2020-10-22 NOTE — PROGRESS NOTES
HISTORY OF PRESENT ILLNESS  Ros Livingsotn is a 45 y.o. male. 5/17 d/nkechi fenofibrate due to rectal odor without incontinence  11/16 thinks that he is getting mild bronchitis for last few days- improving  10/18 - Doing well, no complaints at this time. Denies chest pain, sob, orthopnea, edema, palpitations or dizziness. Hypertension   The history is provided by the medical records. Associated symptoms include shortness of breath. Pertinent negatives include no chest pain and no headaches. Cholesterol Problem   The history is provided by the medical records. Associated symptoms include shortness of breath. Pertinent negatives include no chest pain and no headaches. Shortness of Breath   The history is provided by the patient. This is a new problem. The problem occurs intermittently. The current episode started more than 1 week ago. The problem has not changed since onset. Associated symptoms include leg swelling. Pertinent negatives include no fever, no headaches, no cough, no wheezing, no PND, no orthopnea, no chest pain, no vomiting, no rash and no claudication. The problem's precipitants include exercise (working in hot weather; carrying heavy stuff and yard work, 2 flights steps). Leg Swelling   The history is provided by the patient. This is a new problem. The current episode started more than 1 week ago (4/20). The problem occurs every several days. Associated symptoms include shortness of breath. Pertinent negatives include no chest pain and no headaches. The symptoms are aggravated by standing. The symptoms are relieved by sleep. Review of Systems   Constitutional: Negative for chills, diaphoresis, fever, malaise/fatigue and weight loss. HENT: Negative for nosebleeds. Eyes: Negative for discharge. Respiratory: Positive for shortness of breath. Negative for cough and wheezing. Cardiovascular: Positive for leg swelling.  Negative for chest pain, palpitations, orthopnea, claudication and PND.   Gastrointestinal: Negative for diarrhea, nausea and vomiting. Genitourinary: Negative for dysuria and hematuria. Musculoskeletal: Negative for joint pain. Skin: Negative for rash. Neurological: Negative for dizziness, seizures, loss of consciousness and headaches. Endo/Heme/Allergies: Negative for polydipsia. Does not bruise/bleed easily. Psychiatric/Behavioral: Negative for depression and substance abuse. The patient does not have insomnia. Allergies   Allergen Reactions    Ace Inhibitors Angioedema    Lisinopril Other (comments)     Lip swelling    Pcn [Penicillins] Rash       Past Medical History:   Diagnosis Date    Essential hypertension with goal blood pressure less than 130/80 2016    Hyperlipemia 2016       Family History   Problem Relation Age of Onset    Heart Attack Neg Hx     Stroke Neg Hx        Social History     Tobacco Use    Smoking status: Former Smoker     Types: Pipe     Last attempt to quit: 2002     Years since quittin.4    Smokeless tobacco: Never Used   Substance Use Topics    Alcohol use: Yes     Alcohol/week: 78.0 standard drinks     Types: 14 Cans of beer, 28 Shots of liquor, 36 Standard drinks or equivalent per week     Frequency: 4 or more times a week     Drinks per session: 3 or 4     Binge frequency: Weekly     Comment: moderate use 2 beers per day and 4 mixed drinks     Drug use: No        Current Outpatient Medications   Medication Sig    fenofibrate (LOFIBRA) 160 mg tablet TAKE ONE TABLET BY MOUTH DAILY    metoprolol tartrate (LOPRESSOR) 25 mg tablet TAKE ONE TABLET BY MOUTH TWICE A DAY    amLODIPine (NORVASC) 10 mg tablet TAKE ONE TABLET BY MOUTH DAILY    hydroCHLOROthiazide (HYDRODIURIL) 12.5 mg tablet TAKE ONE TABLET BY MOUTH DAILY     No current facility-administered medications for this visit. History reviewed. No pertinent surgical history. Diagnostic Studies:   Old records reviewed and show as follows  EKG tracings reviewed by me today. CARDIOLOGY STUDIES 5/12/2016   EKG Result SR, LAHB/RBBB   Some recent data might be hidden   6/16 ECHO  SUMMARY:  Left ventricle: Systolic function was normal. Ejection fraction was  estimated in the range of 55 % to 60 %. There were no regional wall motion  abnormalities. Wall thickness was mildly increased. Right ventricle: The ventricle was mildly dilated. Mitral valve: There was trivial regurgitation. Tricuspid valve: Tricuspid regurgitation peak velocity: 2 m/sec. Pulmonary  artery systolic pressure: 19 mmHg. 6/16 TMT  Conclusion:    1. No ischemic ST-T changes at 100% of predicted maximal heart rate.     2. Normal functional capacity.    3. Appropriate heart rate and moderately hypertensive blood pressure response with baseline hypertension.     4. The patient will require medications for hypertension and Lisinopril 10 mg a day was given today.  Follow up BMP.  Side effects were explained including cough.     5. Clinical correlation is suggested    Visit Vitals  /75 (BP 1 Location: Left arm, BP Patient Position: Sitting)   Pulse 67   Temp 97.9 °F (36.6 °C) (Temporal)   Ht 5' 7\" (1.702 m)   Wt 90.4 kg (199 lb 3.2 oz)   SpO2 97%   BMI 31.20 kg/m²       Mr. Nisha pAple has a reminder for a \"due or due soon\" health maintenance. I have asked that he contact his primary care provider for follow-up on this health maintenance. Physical Exam   Constitutional: He is oriented to person, place, and time. He appears well-developed and well-nourished. No distress. HENT:   Head: Normocephalic and atraumatic. Mouth/Throat: Normal dentition. Eyes: Right eye exhibits no discharge. Left eye exhibits no discharge. No scleral icterus. Neck: Neck supple. No JVD present. Carotid bruit is not present. No thyromegaly present. Cardiovascular: Normal rate, regular rhythm, S1 normal, S2 normal, normal heart sounds and intact distal pulses.  Exam reveals no gallop and no friction rub.   No murmur heard. Pulmonary/Chest: Effort normal and breath sounds normal. He has no wheezes. He has no rales. Abdominal: Soft. He exhibits no mass. There is no abdominal tenderness. Musculoskeletal:         General: Edema present. Lymphadenopathy:        Right cervical: No superficial cervical adenopathy present. Left cervical: No superficial cervical adenopathy present. Neurological: He is alert and oriented to person, place, and time. Skin: Skin is warm and dry. No rash noted. Psychiatric: He has a normal mood and affect. His behavior is normal.       ASSESSMENT and PLAN    Patient advised to take alcohol in moderation to avoid future cardiac and liver problems including arrhythmias, cardiomyopathy and congestive heart failure. HLD: Results for Africa Moore (MRN [id number]) as of 4/11/2019 08:48   Ref. Range 5/8/2017 07:03 3/15/2018 13:10 12/10/2018 07:48   Triglyceride Latest Ref Range: <150 MG/DL 1,133 (H)  155 (H)   Cholesterol, total Latest Ref Range: <200 MG/ (H)  234 (H)   HDL Cholesterol Latest Ref Range: 40 - 60 MG/DL 40  78 (H)   CHOL/HDL Ratio Latest Ref Range: 0 - 5.0   8.3 (H)  3.0   LDL, calculated Latest Ref Range: 0 - 100 MG/DL LDL AND VLDL CHOL. .. 125 (H)   VLDL, calculated Latest Units: MG/DL Calculation not v...  31       Patient continues to drink heavy alcohol on a daily basis. Seems to be developing congestive heart failure over the last few months with dyspnea and edema. Blood pressure is controlled. Check echo and labs as ordered. Continue same medications. Detailed discussion regarding reduction of alcohol. He told me that he does not want to stop it but he will try reducing it. He understands that alcohol will depress the heart muscle causing reduced function and irregular heartbeats as well along with liver disease. Diagnoses and all orders for this visit:    1.  Essential hypertension  -     METABOLIC PANEL, BASIC; Future  -     LIPID PANEL; Future  -     HEPATIC FUNCTION PANEL; Future  -     CBC W/O DIFF; Future  -     ECHO ADULT COMPLETE; Future    2. Hypercholesteremia    3. Alcohol abuse    4. Acute congestive heart failure, unspecified heart failure type ([facility] [address]  -     ECHO ADULT COMPLETE; Future        Pertinent laboratory and test data reviewed and discussed with patient. See patient instructions also for other medical advice given    There are no discontinued medications. Follow-up and Dispositions    · Return in about 2 months (around 12/22/2020), or if symptoms worsen or fail to improve.

## 2020-10-25 DIAGNOSIS — I10 ESSENTIAL HYPERTENSION: ICD-10-CM

## 2020-10-25 DIAGNOSIS — I50.9 ACUTE CONGESTIVE HEART FAILURE, UNSPECIFIED HEART FAILURE TYPE (HCC): ICD-10-CM

## 2020-10-29 DIAGNOSIS — E78.5 HYPERLIPIDEMIA, UNSPECIFIED HYPERLIPIDEMIA TYPE: ICD-10-CM

## 2020-10-29 RX ORDER — FENOFIBRATE 160 MG/1
TABLET ORAL
Qty: 30 TAB | Refills: 0 | Status: SHIPPED | OUTPATIENT
Start: 2020-10-29 | End: 2020-11-03

## 2020-11-02 DIAGNOSIS — E78.5 HYPERLIPIDEMIA, UNSPECIFIED HYPERLIPIDEMIA TYPE: ICD-10-CM

## 2020-11-03 RX ORDER — FENOFIBRATE 160 MG/1
TABLET ORAL
Qty: 30 TAB | Refills: 0 | Status: SHIPPED | OUTPATIENT
Start: 2020-11-03 | End: 2021-01-04

## 2020-11-16 DIAGNOSIS — I10 ESSENTIAL HYPERTENSION: ICD-10-CM

## 2020-11-17 RX ORDER — HYDROCHLOROTHIAZIDE 12.5 MG/1
TABLET ORAL
Qty: 90 TAB | Refills: 4 | Status: SHIPPED | OUTPATIENT
Start: 2020-11-17 | End: 2020-12-18

## 2020-11-30 ENCOUNTER — HOSPITAL ENCOUNTER (OUTPATIENT)
Dept: LAB | Age: 50
Discharge: HOME | End: 2020-11-30
Payer: COMMERCIAL

## 2020-11-30 DIAGNOSIS — I10 ESSENTIAL HYPERTENSION WITH GOAL BLOOD PRESSURE LESS THAN 130/80: Primary | ICD-10-CM

## 2020-11-30 DIAGNOSIS — I10 ESSENTIAL HYPERTENSION: ICD-10-CM

## 2020-11-30 LAB
ALBUMIN SERPL-MCNC: 4 G/DL (ref 3.4–5)
ALBUMIN/GLOB SERPL: 1.1 {RATIO} (ref 0.8–1.7)
ALP SERPL-CCNC: 38 U/L (ref 45–117)
ALT SERPL-CCNC: 37 U/L (ref 16–61)
ANION GAP SERPL CALC-SCNC: 3 MMOL/L (ref 3–18)
AST SERPL-CCNC: 32 U/L (ref 10–38)
BILIRUB DIRECT SERPL-MCNC: 0.2 MG/DL (ref 0–0.2)
BILIRUB SERPL-MCNC: 0.5 MG/DL (ref 0.2–1)
BUN SERPL-MCNC: 15 MG/DL (ref 7–18)
BUN/CREAT SERPL: 13 (ref 12–20)
CALCIUM SERPL-MCNC: 9.6 MG/DL (ref 8.5–10.1)
CHLORIDE SERPL-SCNC: 107 MMOL/L (ref 100–111)
CHOLEST SERPL-MCNC: 251 MG/DL
CO2 SERPL-SCNC: 30 MMOL/L (ref 21–32)
CREAT SERPL-MCNC: 1.17 MG/DL (ref 0.6–1.3)
ERYTHROCYTE [DISTWIDTH] IN BLOOD BY AUTOMATED COUNT: 12.5 % (ref 11.6–14.5)
GLOBULIN SER CALC-MCNC: 3.5 G/DL (ref 2–4)
GLUCOSE SERPL-MCNC: 92 MG/DL (ref 74–99)
HCT VFR BLD AUTO: 40.7 % (ref 36–48)
HDLC SERPL-MCNC: 57 MG/DL (ref 40–60)
HDLC SERPL: 4.4 {RATIO} (ref 0–5)
HGB BLD-MCNC: 14.3 G/DL (ref 13–16)
LDLC SERPL CALC-MCNC: 157.4 MG/DL (ref 0–100)
LIPID PROFILE,FLP: ABNORMAL
MCH RBC QN AUTO: 32.6 PG (ref 24–34)
MCHC RBC AUTO-ENTMCNC: 35.1 G/DL (ref 31–37)
MCV RBC AUTO: 92.9 FL (ref 74–97)
PLATELET # BLD AUTO: 271 K/UL (ref 135–420)
PMV BLD AUTO: 9.6 FL (ref 9.2–11.8)
POTASSIUM SERPL-SCNC: 4.6 MMOL/L (ref 3.5–5.5)
PROT SERPL-MCNC: 7.5 G/DL (ref 6.4–8.2)
RBC # BLD AUTO: 4.38 M/UL (ref 4.7–5.5)
SODIUM SERPL-SCNC: 140 MMOL/L (ref 136–145)
TRIGL SERPL-MCNC: 183 MG/DL (ref ?–150)
VLDLC SERPL CALC-MCNC: 36.6 MG/DL
WBC # BLD AUTO: 5.2 K/UL (ref 4.6–13.2)

## 2020-11-30 PROCEDURE — 80061 LIPID PANEL: CPT

## 2020-11-30 PROCEDURE — 80076 HEPATIC FUNCTION PANEL: CPT

## 2020-11-30 PROCEDURE — 85027 COMPLETE CBC AUTOMATED: CPT

## 2020-11-30 PROCEDURE — 80048 BASIC METABOLIC PNL TOTAL CA: CPT

## 2020-11-30 PROCEDURE — 36415 COLL VENOUS BLD VENIPUNCTURE: CPT

## 2020-11-30 RX ORDER — ATORVASTATIN CALCIUM 10 MG/1
10 TABLET, FILM COATED ORAL DAILY
Qty: 90 TAB | Refills: 3 | Status: SHIPPED | OUTPATIENT
Start: 2020-11-30 | End: 2021-11-22

## 2020-11-30 RX ORDER — ATORVASTATIN CALCIUM 10 MG/1
10 TABLET, FILM COATED ORAL DAILY
COMMUNITY
End: 2020-11-30 | Stop reason: SDUPTHER

## 2020-11-30 NOTE — PROGRESS NOTES
Please contact patient and do the following asap    Add Lipitor 10 mg/day. Make sure confirm with the patient if he will take this medicine in addition to his other medications. Get fasting Lipid profile and LFTs in 6 wks. Please reinforce diet and exercise.

## 2020-11-30 NOTE — TELEPHONE ENCOUNTER
Called patient, verified name and date of birth. Informed patient that lab results were in and advised of previous result note from Dr. Henry Mason, patient stated being okay with adding a new medication. Patient verbalized understanding and had no further questions. Requested Prescriptions     Pending Prescriptions Disp Refills    atorvastatin (Lipitor) 10 mg tablet 90 Tab 3     Sig: Take 1 Tab by mouth daily.

## 2020-11-30 NOTE — TELEPHONE ENCOUNTER
----- Message from Iban Mejia MD sent at 11/30/2020 10:35 AM EST -----  Please contact patient and do the following asap    Add Lipitor 10 mg/day. Make sure confirm with the patient if he will take this medicine in addition to his other medications. Get fasting Lipid profile and LFTs in 6 wks. Please reinforce diet and exercise.

## 2020-12-02 LAB
AV VELOCITY RATIO: 0.92
ECHO AO ASC DIAM: 3.36 CM
ECHO AO ROOT DIAM: 3.14 CM
ECHO AV PEAK GRADIENT: 8.3 MMHG
ECHO AV PEAK VELOCITY: 143.85 CM/S
ECHO LA AREA 2C: 23.26 CM2
ECHO LA AREA 4C: 16.7 CM2
ECHO LA MAJOR AXIS: 3.59 CM
ECHO LA MINOR AXIS: 1.78 CM
ECHO LA TO AORTIC ROOT RATIO: 1.14
ECHO LA VOL 2C: 58.81 ML (ref 18–58)
ECHO LA VOL 4C: 42.23 ML (ref 18–58)
ECHO LA VOL BP: 61.61 ML (ref 18–58)
ECHO LA VOL/BSA BIPLANE: 30.53 ML/M2 (ref 16–28)
ECHO LA VOLUME INDEX A2C: 29.15 ML/M2 (ref 16–28)
ECHO LA VOLUME INDEX A4C: 20.93 ML/M2 (ref 16–28)
ECHO LV E' LATERAL VELOCITY: 10.87 CM/S
ECHO LV E' SEPTAL VELOCITY: 8.12 CM/S
ECHO LV EDV TEICHHOLZ: 0.59 ML
ECHO LV ESV TEICHHOLZ: 0.25 ML
ECHO LV INTERNAL DIMENSION DIASTOLIC: 4.65 CM (ref 4.2–5.9)
ECHO LV INTERNAL DIMENSION SYSTOLIC: 3.23 CM
ECHO LV IVSD: 1.19 CM (ref 0.6–1)
ECHO LV MASS 2D: 203.5 G (ref 88–224)
ECHO LV MASS INDEX 2D: 100.9 G/M2 (ref 49–115)
ECHO LV POSTERIOR WALL DIASTOLIC: 1.17 CM (ref 0.6–1)
ECHO LVOT PEAK GRADIENT: 6.9 MMHG
ECHO LVOT PEAK VELOCITY: 131.67 CM/S
ECHO MV "A" WAVE DURATION: 140.48 MS
ECHO MV A VELOCITY: 54.36 CM/S
ECHO MV AREA PHT: 3.8 CM2
ECHO MV E DECELERATION TIME (DT): 201.5 MS
ECHO MV E VELOCITY: 70.38 CM/S
ECHO MV E/A RATIO: 1.29
ECHO MV E/E' LATERAL: 6.47
ECHO MV E/E' RATIO (AVERAGED): 7.57
ECHO MV E/E' SEPTAL: 8.67
ECHO MV PRESSURE HALF TIME (PHT): 58.4 MS
ECHO PV MAX VELOCITY: 89.96 CM/S
ECHO PV PEAK GRADIENT: 3.2 MMHG
ECHO PV PEAK INSTANTANEOUS GRADIENT SYSTOLIC: 3.24 MMHG
ECHO RA AREA 4C: 14.8 CM2
ECHO RV INTERNAL DIMENSION: 4.23 CM
ECHO RV TAPSE: 2.34 CM (ref 1.5–2)
LVFS 2D: 30.6 %
LVSV (TEICH): 28.09 ML
MV DEC SLOPE: 3.49

## 2020-12-18 ENCOUNTER — OFFICE VISIT (OUTPATIENT)
Dept: CARDIOLOGY CLINIC | Age: 50
End: 2020-12-18
Payer: COMMERCIAL

## 2020-12-18 VITALS
HEIGHT: 67 IN | SYSTOLIC BLOOD PRESSURE: 142 MMHG | WEIGHT: 197 LBS | TEMPERATURE: 97.4 F | HEART RATE: 63 BPM | BODY MASS INDEX: 30.92 KG/M2 | DIASTOLIC BLOOD PRESSURE: 84 MMHG | OXYGEN SATURATION: 98 %

## 2020-12-18 DIAGNOSIS — E78.00 HYPERCHOLESTEREMIA: ICD-10-CM

## 2020-12-18 DIAGNOSIS — I10 ESSENTIAL HYPERTENSION WITH GOAL BLOOD PRESSURE LESS THAN 130/80: Primary | ICD-10-CM

## 2020-12-18 DIAGNOSIS — F10.10 ALCOHOL ABUSE: ICD-10-CM

## 2020-12-18 DIAGNOSIS — I45.2 RBBB WITH LEFT ANTERIOR FASCICULAR BLOCK: ICD-10-CM

## 2020-12-18 PROCEDURE — 93000 ELECTROCARDIOGRAM COMPLETE: CPT | Performed by: INTERNAL MEDICINE

## 2020-12-18 PROCEDURE — 99214 OFFICE O/P EST MOD 30 MIN: CPT | Performed by: INTERNAL MEDICINE

## 2020-12-18 RX ORDER — HYDROCHLOROTHIAZIDE 25 MG/1
25 TABLET ORAL DAILY
Qty: 90 TAB | Refills: 1 | Status: SHIPPED | OUTPATIENT
Start: 2020-12-18 | End: 2021-06-16

## 2020-12-18 NOTE — PROGRESS NOTES
HISTORY OF PRESENT ILLNESS  Willian Connell is a 45 y.o. male. Patient denies significant chest pain, SOB, palpitations, edema, dizziness      5/17 d/nkechi fenofibrate due to rectal odor without incontinence  11/16 thinks that he is getting mild bronchitis for last few days- improving  10/18 - Doing well, no complaints at this time. Denies chest pain, sob, orthopnea, edema, palpitations or dizziness. Cholesterol Problem  The history is provided by the medical records. Associated symptoms include shortness of breath. Pertinent negatives include no chest pain and no headaches. Hypertension  The history is provided by the medical records. Associated symptoms include shortness of breath. Pertinent negatives include no chest pain and no headaches. Shortness of Breath  The history is provided by the patient. This is a new problem. The problem occurs intermittently. The current episode started more than 1 week ago. The problem has not changed since onset. Pertinent negatives include no fever, no headaches, no cough, no wheezing, no PND, no orthopnea, no chest pain, no vomiting, no rash, no leg swelling and no claudication. The problem's precipitants include exercise (working in hot weather; carrying heavy stuff and yard work, 2 flights steps; ). Leg Swelling  The history is provided by the patient. This is a new problem. The current episode started more than 1 week ago (4/20). The problem occurs every several days. The problem has been resolved. Associated symptoms include shortness of breath. Pertinent negatives include no chest pain and no headaches. The symptoms are aggravated by standing. The symptoms are relieved by sleep. Review of Systems   Constitutional: Negative for chills, diaphoresis, fever, malaise/fatigue and weight loss. HENT: Negative for nosebleeds. Eyes: Negative for discharge. Respiratory: Positive for shortness of breath. Negative for cough and wheezing.     Cardiovascular: Negative for chest pain, palpitations, orthopnea, claudication, leg swelling and PND. Gastrointestinal: Negative for diarrhea, nausea and vomiting. Genitourinary: Negative for dysuria and hematuria. Musculoskeletal: Negative for joint pain. Skin: Negative for rash. Neurological: Negative for dizziness, seizures, loss of consciousness and headaches. Endo/Heme/Allergies: Negative for polydipsia. Does not bruise/bleed easily. Psychiatric/Behavioral: Negative for depression and substance abuse. The patient does not have insomnia. Allergies   Allergen Reactions    Ace Inhibitors Angioedema    Lisinopril Other (comments)     Lip swelling    Pcn [Penicillins] Rash       Past Medical History:   Diagnosis Date    Essential hypertension with goal blood pressure less than 130/80 2016    Hyperlipemia 2016       Family History   Problem Relation Age of Onset    Heart Attack Neg Hx     Stroke Neg Hx        Social History     Tobacco Use    Smoking status: Former Smoker     Types: Pipe     Quit date: 2002     Years since quittin.5    Smokeless tobacco: Never Used   Substance Use Topics    Alcohol use: Yes     Alcohol/week: 78.0 standard drinks     Types: 14 Cans of beer, 28 Shots of liquor, 36 Standard drinks or equivalent per week     Frequency: 4 or more times a week     Drinks per session: 3 or 4     Binge frequency: Weekly     Comment: moderate use 2 beers per day and 4 mixed drinks     Drug use: No        Current Outpatient Medications   Medication Sig    metoprolol tartrate (LOPRESSOR) 25 mg tablet TAKE ONE TABLET BY MOUTH TWICE A DAY    atorvastatin (Lipitor) 10 mg tablet Take 1 Tab by mouth daily.     hydroCHLOROthiazide (HYDRODIURIL) 12.5 mg tablet TAKE ONE TABLET BY MOUTH DAILY    fenofibrate (LOFIBRA) 160 mg tablet TAKE ONE TABLET BY MOUTH DAILY    amLODIPine (NORVASC) 10 mg tablet TAKE ONE TABLET BY MOUTH DAILY     No current facility-administered medications for this visit. History reviewed. No pertinent surgical history. Diagnostic Studies: Old records reviewed and show as follows  EKG tracings reviewed by me today. CARDIOLOGY STUDIES 5/12/2016   EKG Result SR, LAHB/RBBB   Some recent data might be hidden   6/16 ECHO  SUMMARY:  Left ventricle: Systolic function was normal. Ejection fraction was  estimated in the range of 55 % to 60 %. There were no regional wall motion  abnormalities. Wall thickness was mildly increased. Right ventricle: The ventricle was mildly dilated. Mitral valve: There was trivial regurgitation. Tricuspid valve: Tricuspid regurgitation peak velocity: 2 m/sec. Pulmonary  artery systolic pressure: 19 mmHg. 6/16 TMT  Conclusion:    1. No ischemic ST-T changes at 100% of predicted maximal heart rate.     2. Normal functional capacity.    3. Appropriate heart rate and moderately hypertensive blood pressure response with baseline hypertension.     4. The patient will require medications for hypertension and Lisinopril 10 mg a day was given today.  Follow up BMP.  Side effects were explained including cough.     5. Clinical correlation is suggested  12/20 echo  Interpretation Summary    · LV: Estimated LVEF is 55 - 60%. Normal cavity size, systolic function (ejection fraction normal) and diastolic function. Mild concentric hypertrophy. Wall motion: normal.  · LA: Left Atrium volume index is 30.5 mL/m2. · RV: Normal right ventricular size and function. · No hemodynamically significant valvular pathology. Comparison Study Information    Prior Study    When compared to the previous study from 6/15/16, the right ventricle size now appears normal.       Visit Vitals  BP (!) 142/84 (BP 1 Location: Left arm, BP Patient Position: Sitting)   Pulse 63   Temp 97.4 °F (36.3 °C) (Temporal)   Ht 5' 7\" (1.702 m)   Wt 89.4 kg (197 lb)   SpO2 98%   BMI 30.85 kg/m²       Mr. Octavio Khan has a reminder for a \"due or due soon\" health maintenance.  I have asked that he contact his primary care provider for follow-up on this health maintenance. Physical Exam   Constitutional: He is oriented to person, place, and time. He appears well-developed and well-nourished. No distress. HENT:   Head: Normocephalic and atraumatic. Mouth/Throat: Normal dentition. Eyes: Right eye exhibits no discharge. Left eye exhibits no discharge. No scleral icterus. Neck: Neck supple. No JVD present. Carotid bruit is not present. No thyromegaly present. Cardiovascular: Normal rate, regular rhythm, S1 normal, S2 normal, normal heart sounds and intact distal pulses. Exam reveals no gallop and no friction rub. No murmur heard. Pulmonary/Chest: Effort normal and breath sounds normal. He has no wheezes. He has no rales. Abdominal: Soft. He exhibits no mass. There is no abdominal tenderness. Musculoskeletal:         General: No edema. Lymphadenopathy:        Right cervical: No superficial cervical adenopathy present. Left cervical: No superficial cervical adenopathy present. Neurological: He is alert and oriented to person, place, and time. Skin: Skin is warm and dry. No rash noted. Psychiatric: He has a normal mood and affect. His behavior is normal.       ASSESSMENT and PLAN    Patient advised to take alcohol in moderation to avoid future cardiac and liver problems including arrhythmias, cardiomyopathy and congestive heart failure. HLD: Results for Blanka Hunter (MRN [id number]) as of 4/11/2019 08:48   Ref. Range 5/8/2017 07:03 3/15/2018 13:10 12/10/2018 07:48   Triglyceride Latest Ref Range: <150 MG/DL 1,133 (H)  155 (H)   Cholesterol, total Latest Ref Range: <200 MG/ (H)  234 (H)   HDL Cholesterol Latest Ref Range: 40 - 60 MG/DL 40  78 (H)   CHOL/HDL Ratio Latest Ref Range: 0 - 5.0   8.3 (H)  3.0   LDL, calculated Latest Ref Range: 0 - 100 MG/DL LDL AND VLDL CHOL. ..   125 (H)   VLDL, calculated Latest Units: MG/DL Calculation not v...  31 Patient continues to drink heavy alcohol on a daily basis. Blood pressure is mildly elevated. Increase HCTZ and follow home chart. Echo has shown normal ejection fraction and normalization of the right-sided chamber size. Patient has cut down on ham intake and edema is resolved. Continue same medications. Labs as ordered. Detailed discussion regarding reduction of alcohol. He told me that he does not want to stop it but he will try reducing it. He understands that alcohol will depress the heart muscle causing reduced function and irregular heartbeats as well along with liver disease. Diagnoses and all orders for this visit:    1. Essential hypertension with goal blood pressure less than 130/80  -     AMB POC EKG ROUTINE W/ 12 LEADS, INTER & REP  -     hydroCHLOROthiazide (HYDRODIURIL) 25 mg tablet; Take 1 Tab by mouth daily.  -     METABOLIC PANEL, BASIC; Future    2. Hypercholesteremia  -     LIPID PANEL; Future  -     HEPATIC FUNCTION PANEL; Future    3. Alcohol abuse    4. RBBB with left anterior fascicular block        Pertinent laboratory and test data reviewed and discussed with patient.   See patient instructions also for other medical advice given    Medications Discontinued During This Encounter   Medication Reason    hydroCHLOROthiazide (HYDRODIURIL) 12.5 mg tablet        Follow-up and Dispositions    · Return in about 6 months (around 6/18/2021), or if symptoms worsen or fail to improve, for post test.

## 2020-12-18 NOTE — PATIENT INSTRUCTIONS
Medications Discontinued During This Encounter   Medication Reason    hydroCHLOROthiazide (HYDRODIURIL) 12.5 mg tablet      After the recommended changes have been made in blood pressure medicines, patient advised to keep BP/HR(pulse rate) chart twice daily and bring us results in next 4 to 5 days. Patient may send the results via \"My Chart\" if desired. Please rest for 5-10 minutes before checking blood pressure. Sit on a comfortable chair without crossing the legs and put your arm on a table. We recommend that you use an upper arm cuff. Check the blood pressure 3 times each time you check the blood pressure and record the lowest reading. If you check the blood pressure in both arms, use the higher reading. Learning About the [facility] Diet  What is the Mediterranean diet? The Mediterranean diet is a style of eating rather than a diet plan. It features foods eaten in [place], [place], [place] and [place], and other countries along the [facility]. It emphasizes eating foods like fish, fruits, vegetables, beans, high-fiber breads and whole grains, nuts, and olive oil. This style of eating includes limited red meat, cheese, and sweets. Why choose the Mediterranean diet? A Mediterranean-style diet may improve heart health. It contains more fat than other heart-healthy diets. But the fats are mainly from nuts, unsaturated oils (such as fish oils and olive oil), and certain nut or seed oils (such as canola, soybean, or flaxseed oil). These fats may help protect the heart and blood vessels. How can you get started on the Mediterranean diet? Here are some things you can do to switch to a more Mediterranean way of eating. What to eat  · Eat a variety of fruits and vegetables each day, such as grapes, blueberries, tomatoes, broccoli, peppers, figs, olives, spinach, eggplant, beans, lentils, and chickpeas.   · Eat a variety of whole-grain foods each day, such as oats, brown rice, and whole wheat bread, pasta, and couscous. · Eat fish at least 2 times a week. Try tuna, salmon, mackerel, lake trout, herring, or sardines. · Eat moderate amounts of low-fat dairy products, such as milk, cheese, or yogurt. · Eat moderate amounts of poultry and eggs. · Choose healthy (unsaturated) fats, such as nuts, olive oil, and certain nut or seed oils like canola, soybean, and flaxseed. · Limit unhealthy (saturated) fats, such as butter, palm oil, and coconut oil. And limit fats found in animal products, such as meat and dairy products made with whole milk. Try to eat red meat only a few times a month in very small amounts. · Limit sweets and desserts to only a few times a week. This includes sugar-sweetened drinks like soda. The Mediterranean diet may also include red wine with your meal--1 glass each day for women and up to 2 glasses a day for men. Tips for eating at home  · Use herbs, spices, garlic, lemon zest, and citrus juice instead of salt to add flavor to foods. · Add avocado slices to your sandwich instead of price. · Have fish for lunch or dinner instead of red meat. Brush the fish with olive oil, and broil or grill it. · Sprinkle your salad with seeds or nuts instead of cheese. · Cook with olive or canola oil instead of butter or oils that are high in saturated fat. · Switch from 2% milk or whole milk to 1% or fat-free milk. · Dip raw vegetables in a vinaigrette dressing or hummus instead of dips made from mayonnaise or sour cream.  · Have a piece of fruit for dessert instead of a piece of cake. Try baked apples, or have some dried fruit. Tips for eating out  · Try broiled, grilled, baked, or poached fish instead of having it fried or breaded. · Ask your  to have your meals prepared with olive oil instead of butter. · Order dishes made with marinara sauce or sauces made from olive oil. Avoid sauces made from cream or mayonnaise.   · Choose whole-grain breads, whole wheat pasta and pizza crust, brown rice, beans, and lentils. · Cut back on butter or margarine on bread. Instead, you can dip your bread in a small amount of olive oil. · Ask for a side salad or grilled vegetables instead of french fries or chips. Where can you learn more? Go to http://www.[employer].com/  Enter O407 in the search box to learn more about \"Learning About the Mediterranean Diet. \"  Current as of: August 22, 2019               Content Version: 12.6  © 6576-8239 [employer]. Care instructions adapted under license by [employer] (which disclaims liability or warranty for this information). If you have questions about a medical condition or this instruction, always ask your healthcare professional. [address] any warranty or liability for your use of this information.

## 2020-12-18 NOTE — PROGRESS NOTES
1. Have you been to the ER, urgent care clinic since your last visit? Hospitalized since your last visit?     no    2. Have you seen or consulted any other health care providers outside of the [address] [place] [address] since your last visit? Include any pap smears or colon screening. No     3. Since your last visit, have you had any of the following symptoms?      no    4. Have you had any blood work, X-rays or cardiac testing? Yes echo,bmp,lipid,cbc     Requested: YES     In [facility]: YES    5. Where do you normally have your labs drawn? [facility]    6. Do you need any refills today?    no

## 2021-06-16 DIAGNOSIS — I10 ESSENTIAL HYPERTENSION WITH GOAL BLOOD PRESSURE LESS THAN 130/80: ICD-10-CM

## 2021-06-16 RX ORDER — HYDROCHLOROTHIAZIDE 25 MG/1
TABLET ORAL
Qty: 90 TABLET | Refills: 0 | Status: SHIPPED | OUTPATIENT
Start: 2021-06-16 | End: 2021-09-14

## 2021-06-18 ENCOUNTER — OFFICE VISIT (OUTPATIENT)
Dept: CARDIOLOGY CLINIC | Age: 51
End: 2021-06-18
Payer: COMMERCIAL

## 2021-06-18 VITALS
OXYGEN SATURATION: 97 % | BODY MASS INDEX: 32.96 KG/M2 | DIASTOLIC BLOOD PRESSURE: 77 MMHG | WEIGHT: 210 LBS | HEIGHT: 67 IN | SYSTOLIC BLOOD PRESSURE: 130 MMHG | HEART RATE: 53 BPM

## 2021-06-18 DIAGNOSIS — E78.00 HYPERCHOLESTEREMIA: ICD-10-CM

## 2021-06-18 DIAGNOSIS — F10.10 ALCOHOL ABUSE: ICD-10-CM

## 2021-06-18 DIAGNOSIS — I10 ESSENTIAL HYPERTENSION WITH GOAL BLOOD PRESSURE LESS THAN 130/80: Primary | ICD-10-CM

## 2021-06-18 PROCEDURE — 99214 OFFICE O/P EST MOD 30 MIN: CPT | Performed by: INTERNAL MEDICINE

## 2021-06-18 NOTE — PROGRESS NOTES
HISTORY OF PRESENT ILLNESS  Elizabeth Manzanares is a 45 y.o. male. Patient denies significant chest pain, SOB, palpitations, edema, dizziness      5/17 d/nkechi fenofibrate due to rectal odor without incontinence  11/16 thinks that he is getting mild bronchitis for last few days- improving  10/18 - Doing well, no complaints at this time. Denies chest pain, sob, orthopnea, edema, palpitations or dizziness. Cholesterol Problem  The history is provided by the medical records. Associated symptoms include shortness of breath. Pertinent negatives include no chest pain and no headaches. Hypertension  The history is provided by the medical records. Associated symptoms include shortness of breath. Pertinent negatives include no chest pain and no headaches. Shortness of Breath  The history is provided by the patient. This is a new problem. The problem occurs intermittently. The current episode started more than 1 week ago. The problem has not changed since onset. Pertinent negatives include no fever, no headaches, no cough, no wheezing, no PND, no orthopnea, no chest pain, no vomiting, no rash, no leg swelling and no claudication. The problem's precipitants include exercise (working in hot weather; carrying heavy stuff and yard work, 2 flights steps; ). Review of Systems   Constitutional: Negative for chills, diaphoresis, fever, malaise/fatigue and weight loss. HENT: Negative for nosebleeds. Eyes: Negative for discharge. Respiratory: Positive for shortness of breath. Negative for cough and wheezing. Cardiovascular: Negative for chest pain, palpitations, orthopnea, claudication, leg swelling and PND. Gastrointestinal: Negative for diarrhea, nausea and vomiting. Genitourinary: Negative for dysuria and hematuria. Musculoskeletal: Negative for joint pain. Skin: Negative for rash. Neurological: Negative for dizziness, seizures, loss of consciousness and headaches.    Endo/Heme/Allergies: Negative for polydipsia. Does not bruise/bleed easily. Psychiatric/Behavioral: Negative for depression and substance abuse. The patient does not have insomnia. Allergies   Allergen Reactions    Ace Inhibitors Angioedema    Lisinopril Other (comments)     Lip swelling    Pcn [Penicillins] Rash       Past Medical History:   Diagnosis Date    Essential hypertension with goal blood pressure less than 130/80 2016    Hyperlipemia 2016       Family History   Problem Relation Age of Onset    Heart Attack Neg Hx     Stroke Neg Hx        Social History     Tobacco Use    Smoking status: Former Smoker     Types: Pipe     Quit date: 2002     Years since quittin.0    Smokeless tobacco: Never Used   Vaping Use    Vaping Use: Never used   Substance Use Topics    Alcohol use: Yes     Alcohol/week: 78.0 standard drinks     Types: 14 Cans of beer, 28 Shots of liquor, 36 Standard drinks or equivalent per week     Comment: moderate use 2 beers per day and 4 mixed drinks     Drug use: No        Current Outpatient Medications   Medication Sig    hydroCHLOROthiazide (HYDRODIURIL) 25 mg tablet TAKE ONE TABLET BY MOUTH DAILY    amLODIPine (NORVASC) 10 mg tablet TAKE ONE TABLET BY MOUTH DAILY    fenofibrate (LOFIBRA) 160 mg tablet TAKE ONE TABLET BY MOUTH DAILY    metoprolol tartrate (LOPRESSOR) 25 mg tablet TAKE ONE TABLET BY MOUTH TWICE A DAY    atorvastatin (Lipitor) 10 mg tablet Take 1 Tab by mouth daily. No current facility-administered medications for this visit. No past surgical history on file. Diagnostic Studies: Old records reviewed and show as follows  EKG tracings reviewed by me today. CARDIOLOGY STUDIES 2016   EKG Result SR, LAHB/RBBB   Some recent data might be hidden    ECHO  SUMMARY:  Left ventricle: Systolic function was normal. Ejection fraction was  estimated in the range of 55 % to 60 %. There were no regional wall motion  abnormalities.  Wall thickness was mildly increased. Right ventricle: The ventricle was mildly dilated. Mitral valve: There was trivial regurgitation. Tricuspid valve: Tricuspid regurgitation peak velocity: 2 m/sec. Pulmonary  artery systolic pressure: 19 mmHg. 6/16 TMT  Conclusion:    1. No ischemic ST-T changes at 100% of predicted maximal heart rate.     2. Normal functional capacity.    3. Appropriate heart rate and moderately hypertensive blood pressure response with baseline hypertension.     4. The patient will require medications for hypertension and Lisinopril 10 mg a day was given today.  Follow up BMP.  Side effects were explained including cough.     5. Clinical correlation is suggested  12/20 echo  Interpretation Summary    · LV: Estimated LVEF is 55 - 60%. Normal cavity size, systolic function (ejection fraction normal) and diastolic function. Mild concentric hypertrophy. Wall motion: normal.  · LA: Left Atrium volume index is 30.5 mL/m2. · RV: Normal right ventricular size and function. · No hemodynamically significant valvular pathology. Comparison Study Information    Prior Study    When compared to the previous study from 6/15/16, the right ventricle size now appears normal.       Visit Vitals  /77 (BP 1 Location: Left upper arm, BP Patient Position: Sitting, BP Cuff Size: Adult)   Pulse (!) 53   Ht 5' 7\" (1.702 m)   Wt 95.3 kg (210 lb)   SpO2 97%   BMI 32.89 kg/m²       Mr. Mansi Elliott has a reminder for a \"due or due soon\" health maintenance. I have asked that he contact his primary care provider for follow-up on this health maintenance. Physical Exam  Constitutional:       General: He is not in acute distress. Appearance: He is well-developed. HENT:      Head: Normocephalic and atraumatic. Mouth/Throat:      Dentition: Normal dentition. Eyes:      General: No scleral icterus. Right eye: No discharge. Left eye: No discharge. Neck:      Thyroid: No thyromegaly.       Vascular: No carotid bruit or JVD. Cardiovascular:      Rate and Rhythm: Normal rate and regular rhythm. Pulses: Intact distal pulses. Heart sounds: Normal heart sounds, S1 normal and S2 normal. No murmur heard. No friction rub. No gallop. Pulmonary:      Effort: Pulmonary effort is normal.      Breath sounds: Normal breath sounds. No wheezing or rales. Abdominal:      Palpations: Abdomen is soft. There is no mass. Tenderness: There is no abdominal tenderness. Musculoskeletal:      Cervical back: Neck supple. Lymphadenopathy:      Cervical:      Right cervical: No superficial cervical adenopathy. Left cervical: No superficial cervical adenopathy. Skin:     General: Skin is warm and dry. Findings: No rash. Neurological:      Mental Status: He is alert and oriented to person, place, and time. Psychiatric:         Behavior: Behavior normal.         ASSESSMENT and PLAN    Patient advised to take alcohol in moderation to avoid future cardiac and liver problems including arrhythmias, cardiomyopathy and congestive heart failure. HLD: Results for Tammy Locke (MRN [id number]) as of 6/18/2021 08:44   Ref. Range 11/30/2020 07:18   Triglyceride Latest Ref Range: <150 MG/ (H)   Cholesterol, total Latest Ref Range: <200 MG/ (H)   HDL Cholesterol Latest Ref Range: 40 - 60 MG/DL 57   CHOL/HDL Ratio Latest Ref Range: 0 - 5.0   4.4   VLDL, calculated Latest Units: MG/DL 36.6   LDL, calculated Latest Ref Range: 0 - 100 MG/.4 (H)         12/20 patient continues to drink heavy alcohol on a daily basis. Blood pressure is mildly elevated. Increase HCTZ and follow home chart. Echo has shown normal ejection fraction and normalization of the right-sided chamber size. Patient has cut down on ham intake and edema is resolved. Continue same medications. Labs as ordered. Detailed discussion regarding reduction of alcohol.   He told me that he does not want to stop it but he will try reducing it. He understands that alcohol will depress the heart muscle causing reduced function and irregular heartbeats as well along with liver disease. Diagnoses and all orders for this visit:    1. Essential hypertension with goal blood pressure less than 130/80  -     HEPATIC FUNCTION PANEL; Future  -     LIPID PANEL; Future  -     METABOLIC PANEL, BASIC; Future    2. Alcohol abuse    3. Hypercholesteremia  -     LIPID PANEL; Future        Pertinent laboratory and test data reviewed and discussed with patient. See patient instructions also for other medical advice given    There are no discontinued medications. Follow-up and Dispositions    · Return in about 6 months (around 12/18/2021), or if symptoms worsen or fail to improve, for post test.       6/18/2021 blood pressure is controlled. Patient has cut down alcohol significantly and now drinks only beer. He seemed inclined to reduce the beer now as well and he was encouraged to do that. Lipids are not controlled but will repeat a test before adjusting medications as he has cut down his alcohol significantly. Mediterranean diet guidelines printed.

## 2021-06-18 NOTE — PROGRESS NOTES
1. Have you been to the ER, urgent care clinic since your last visit? Hospitalized since your last visit? No    2. Have you seen or consulted any other health care providers outside of the [address] [place] [address] since your last visit? Include any pap smears or colon screening. No     3. Since your last visit, have you had any of the following symptoms? shortness of breath. 4.  Have you had any blood work, X-rays or cardiac testing? No    5. Where do you normally have your labs drawn? [address]    6. Do you need any refills today?    No

## 2021-06-18 NOTE — PATIENT INSTRUCTIONS
There are no discontinued medications. Learning About the [address] [facility] Divergence Diet  What is the Mediterranean diet? The Mediterranean diet is a style of eating rather than a diet plan. It features foods eaten in [place], [place], [place] and [place], and other countries along the [facility]. It emphasizes eating foods like fish, fruits, vegetables, beans, high-fiber breads and whole grains, nuts, and olive oil. This style of eating includes limited red meat, cheese, and sweets. Why choose the Mediterranean diet? A Mediterranean-style diet may improve heart health. It contains more fat than other heart-healthy diets. But the fats are mainly from nuts, unsaturated oils (such as fish oils and olive oil), and certain nut or seed oils (such as canola, soybean, or flaxseed oil). These fats may help protect the heart and blood vessels. How can you get started on the Mediterranean diet? Here are some things you can do to switch to a more Mediterranean way of eating. What to eat  · Eat a variety of fruits and vegetables each day, such as grapes, blueberries, tomatoes, broccoli, peppers, figs, olives, spinach, eggplant, beans, lentils, and chickpeas. · Eat a variety of whole-grain foods each day, such as oats, brown rice, and whole wheat bread, pasta, and couscous. · Eat fish at least 2 times a week. Try tuna, salmon, mackerel, lake trout, herring, or sardines. · Eat moderate amounts of low-fat dairy products, such as milk, cheese, or yogurt. · Eat moderate amounts of poultry and eggs. · Choose healthy (unsaturated) fats, such as nuts, olive oil, and certain nut or seed oils like canola, soybean, and flaxseed. · Limit unhealthy (saturated) fats, such as butter, palm oil, and coconut oil. And limit fats found in animal products, such as meat and dairy products made with whole milk. Try to eat red meat only a few times a month in very small amounts.   · Limit sweets and desserts to only a few times a week. This includes sugar-sweetened drinks like soda. The Mediterranean diet may also include red wine with your meal--1 glass each day for women and up to 2 glasses a day for men. Tips for eating at home  · Use herbs, spices, garlic, lemon zest, and citrus juice instead of salt to add flavor to foods. · Add avocado slices to your sandwich instead of price. · Have fish for lunch or dinner instead of red meat. Brush the fish with olive oil, and broil or grill it. · Sprinkle your salad with seeds or nuts instead of cheese. · Cook with olive or canola oil instead of butter or oils that are high in saturated fat. · Switch from 2% milk or whole milk to 1% or fat-free milk. · Dip raw vegetables in a vinaigrette dressing or hummus instead of dips made from mayonnaise or sour cream.  · Have a piece of fruit for dessert instead of a piece of cake. Try baked apples, or have some dried fruit. Tips for eating out  · Try broiled, grilled, baked, or poached fish instead of having it fried or breaded. · Ask your  to have your meals prepared with olive oil instead of butter. · Order dishes made with marinara sauce or sauces made from olive oil. Avoid sauces made from cream or mayonnaise. · Choose whole-grain breads, whole wheat pasta and pizza crust, brown rice, beans, and lentils. · Cut back on butter or margarine on bread. Instead, you can dip your bread in a small amount of olive oil. · Ask for a side salad or grilled vegetables instead of french fries or chips. Where can you learn more? Go to http://www.pearson.com/  Enter O407 in the search box to learn more about \"Learning About the Mediterranean Diet. \"  Current as of: December 17, 2020               Content Version: 12.8  © 5703-5202 Healthwise, Incorporated. Care instructions adapted under license by [employer] (which disclaims liability or warranty for this information).  If you have questions about a medical condition or this instruction, always ask your healthcare professional. [place] [address] any warranty or liability for your use of this information.

## 2021-06-24 DIAGNOSIS — I10 ESSENTIAL HYPERTENSION: ICD-10-CM

## 2021-06-24 RX ORDER — METOPROLOL TARTRATE 25 MG/1
TABLET, FILM COATED ORAL
Qty: 60 TABLET | Refills: 4 | Status: SHIPPED | OUTPATIENT
Start: 2021-06-24 | End: 2022-01-03

## 2021-08-02 DIAGNOSIS — E78.5 HYPERLIPIDEMIA, UNSPECIFIED HYPERLIPIDEMIA TYPE: ICD-10-CM

## 2021-08-04 RX ORDER — FENOFIBRATE 160 MG/1
TABLET ORAL
Qty: 30 TABLET | Refills: 3 | Status: SHIPPED
Start: 2021-08-04 | End: 2021-08-05

## 2021-08-05 ENCOUNTER — TELEPHONE (OUTPATIENT)
Dept: CARDIOLOGY CLINIC | Age: 51
End: 2021-08-05

## 2021-08-05 DIAGNOSIS — E78.5 HYPERLIPIDEMIA, UNSPECIFIED HYPERLIPIDEMIA TYPE: Primary | ICD-10-CM

## 2021-08-05 RX ORDER — FENOFIBRATE 145 MG/1
145 TABLET, COATED ORAL DAILY
Qty: 30 TABLET | Refills: 3 | Status: SHIPPED | OUTPATIENT
Start: 2021-08-05 | End: 2021-12-15 | Stop reason: SDUPTHER

## 2021-08-05 NOTE — TELEPHONE ENCOUNTER
Requested Prescriptions     Pending Prescriptions Disp Refills    fenofibrate nanocrystallized (TRICOR) 145 mg tablet 30 Tablet 3     Sig: Take 1 Tablet by mouth daily.

## 2021-08-05 NOTE — TELEPHONE ENCOUNTER
Looks like current prescription is fenofibrate 160 mg. May change to 145 mg if this is what is acceptable per message.

## 2021-08-05 NOTE — TELEPHONE ENCOUNTER
Patient insurance require patient to use Fenofibrate 48 mg or 145 mg, its his plan preferred mediation.   These drugs don't require prior approval. Please advise

## 2021-09-01 ENCOUNTER — HOSPITAL ENCOUNTER (OUTPATIENT)
Dept: LAB | Age: 51
Discharge: HOME | End: 2021-09-01
Payer: COMMERCIAL

## 2021-09-01 ENCOUNTER — TELEPHONE (OUTPATIENT)
Dept: CARDIOLOGY CLINIC | Age: 51
End: 2021-09-01

## 2021-09-01 DIAGNOSIS — I10 ESSENTIAL HYPERTENSION WITH GOAL BLOOD PRESSURE LESS THAN 130/80: ICD-10-CM

## 2021-09-01 DIAGNOSIS — E78.00 HYPERCHOLESTEREMIA: ICD-10-CM

## 2021-09-01 LAB
ALBUMIN SERPL-MCNC: 4.1 G/DL (ref 3.4–5)
ALBUMIN/GLOB SERPL: 1.1 {RATIO} (ref 0.8–1.7)
ALP SERPL-CCNC: 60 U/L (ref 45–117)
ALT SERPL-CCNC: 55 U/L (ref 16–61)
ANION GAP SERPL CALC-SCNC: 8 MMOL/L (ref 3–18)
AST SERPL-CCNC: 52 U/L (ref 10–38)
BILIRUB DIRECT SERPL-MCNC: 0.2 MG/DL (ref 0–0.2)
BILIRUB SERPL-MCNC: 0.7 MG/DL (ref 0.2–1)
BUN SERPL-MCNC: 11 MG/DL (ref 7–18)
BUN/CREAT SERPL: 12 (ref 12–20)
CALCIUM SERPL-MCNC: 9.1 MG/DL (ref 8.5–10.1)
CHLORIDE SERPL-SCNC: 105 MMOL/L (ref 100–111)
CHOLEST SERPL-MCNC: 228 MG/DL
CO2 SERPL-SCNC: 29 MMOL/L (ref 21–32)
CREAT SERPL-MCNC: 0.92 MG/DL (ref 0.6–1.3)
GLOBULIN SER CALC-MCNC: 3.8 G/DL (ref 2–4)
GLUCOSE SERPL-MCNC: 93 MG/DL (ref 74–99)
HDLC SERPL-MCNC: 62 MG/DL (ref 40–60)
HDLC SERPL: 3.7 {RATIO} (ref 0–5)
LDLC SERPL CALC-MCNC: 110 MG/DL (ref 0–100)
LIPID PROFILE,FLP: ABNORMAL
POTASSIUM SERPL-SCNC: 4 MMOL/L (ref 3.5–5.5)
PROT SERPL-MCNC: 7.9 G/DL (ref 6.4–8.2)
SODIUM SERPL-SCNC: 142 MMOL/L (ref 136–145)
TRIGL SERPL-MCNC: 280 MG/DL (ref ?–150)
VLDLC SERPL CALC-MCNC: 56 MG/DL

## 2021-09-01 PROCEDURE — 80061 LIPID PANEL: CPT

## 2021-09-01 PROCEDURE — 80048 BASIC METABOLIC PNL TOTAL CA: CPT

## 2021-09-01 PROCEDURE — 80076 HEPATIC FUNCTION PANEL: CPT

## 2021-09-01 PROCEDURE — 36415 COLL VENOUS BLD VENIPUNCTURE: CPT

## 2021-09-01 NOTE — TELEPHONE ENCOUNTER
Called and advised patient the \"Need to reduce white- pasta, rice and bread  Eat brown rice, whole grain bread/pasta\" patient states understanding and states will give it a try.

## 2021-09-01 NOTE — TELEPHONE ENCOUNTER
----- Message from Damir Sood MD sent at 9/1/2021  3:18 PM EDT -----  Please contact patient and do the following asap    Check if compliant with cholesterol meds  Get the names and doses of meds that patient takes and show me asap  Please reinforce diet and exercise.

## 2021-09-01 NOTE — TELEPHONE ENCOUNTER
Called and spoke with patient. Patient states he takes Fenofibrate 145 mg every day and Lipitor 10 mg every day.

## 2021-09-12 DIAGNOSIS — I10 ESSENTIAL HYPERTENSION WITH GOAL BLOOD PRESSURE LESS THAN 130/80: ICD-10-CM

## 2021-09-14 RX ORDER — HYDROCHLOROTHIAZIDE 25 MG/1
TABLET ORAL
Qty: 90 TABLET | Refills: 0 | Status: SHIPPED | OUTPATIENT
Start: 2021-09-14 | End: 2021-12-13 | Stop reason: SDUPTHER

## 2021-11-22 RX ORDER — ATORVASTATIN CALCIUM 10 MG/1
TABLET, FILM COATED ORAL
Qty: 90 TABLET | Refills: 3 | Status: SHIPPED | OUTPATIENT
Start: 2021-11-22

## 2021-12-13 DIAGNOSIS — I10 ESSENTIAL HYPERTENSION WITH GOAL BLOOD PRESSURE LESS THAN 130/80: ICD-10-CM

## 2021-12-13 RX ORDER — HYDROCHLOROTHIAZIDE 25 MG/1
25 TABLET ORAL DAILY
Qty: 90 TABLET | Refills: 1 | Status: SHIPPED | OUTPATIENT
Start: 2021-12-13 | End: 2022-06-17

## 2021-12-15 DIAGNOSIS — E78.5 HYPERLIPIDEMIA, UNSPECIFIED HYPERLIPIDEMIA TYPE: ICD-10-CM

## 2021-12-15 RX ORDER — FENOFIBRATE 145 MG/1
145 TABLET, COATED ORAL DAILY
Qty: 30 TABLET | Refills: 6 | Status: SHIPPED | OUTPATIENT
Start: 2021-12-15 | End: 2022-08-29

## 2021-12-17 ENCOUNTER — OFFICE VISIT (OUTPATIENT)
Dept: CARDIOLOGY CLINIC | Age: 51
End: 2021-12-17
Payer: COMMERCIAL

## 2021-12-17 VITALS
OXYGEN SATURATION: 98 % | HEART RATE: 62 BPM | HEIGHT: 67 IN | SYSTOLIC BLOOD PRESSURE: 131 MMHG | WEIGHT: 213 LBS | BODY MASS INDEX: 33.43 KG/M2 | DIASTOLIC BLOOD PRESSURE: 80 MMHG

## 2021-12-17 DIAGNOSIS — E78.00 HYPERCHOLESTEREMIA: ICD-10-CM

## 2021-12-17 DIAGNOSIS — R06.01 ORTHOPNEA: ICD-10-CM

## 2021-12-17 DIAGNOSIS — I10 ESSENTIAL HYPERTENSION WITH GOAL BLOOD PRESSURE LESS THAN 130/80: Primary | ICD-10-CM

## 2021-12-17 DIAGNOSIS — F10.10 ALCOHOL ABUSE: ICD-10-CM

## 2021-12-17 PROCEDURE — 93000 ELECTROCARDIOGRAM COMPLETE: CPT | Performed by: INTERNAL MEDICINE

## 2021-12-17 PROCEDURE — 99214 OFFICE O/P EST MOD 30 MIN: CPT | Performed by: INTERNAL MEDICINE

## 2021-12-17 NOTE — PATIENT INSTRUCTIONS
There are no discontinued medications. Alcohol Detoxification and Withdrawal: Care Instructions  Your Care Instructions     If you drink alcohol regularly and then suddenly stop, you may go through some physical and emotional problems while the alcohol clears out of your system. Clearing the alcohol from your body is called detoxification, or detox. Physical and emotional problems that may happen during detox are called withdrawal.  Symptoms of withdrawal can be scary and dangerous. Mild symptoms include nausea and vomiting, sweating, shakiness, and intense worry. Severe symptoms include being confused and irritable, feeling things on your body that are not there, seeing or hearing things that are not there, and trembling. You may even have seizures. If your symptoms become severe you must see a doctor. People who drink large amounts of alcohol should not try to detox at home. A person can die of severe alcohol withdrawal.  Symptoms of alcohol withdrawal may begin from 4 to 12 hours after you stop drinking. But they may not start for several days after the last drink. They can last a few days. It is hard to stop drinking. But when you have cleared the alcohol from your system, you will be able to start the next part of your life, free from the burden of being dependent. Follow-up care is a key part of your treatment and safety. Be sure to make and go to all appointments, and call your doctor if you are having problems. It's also a good idea to know your test results and keep a list of the medicines you take. How can you care for yourself at home? · Before you stop drinking, talk to your doctor about how you plan to stop. Be sure to be completely honest with him or her about how much you have been drinking. Your doctor will figure out whether you need to detox in a supervised medical center. · Take your medicines exactly as prescribed.  Call your doctor if you think you are having a problem with your medicine. · Make sure someone you trust is with you the whole time. Have friends and family members take turns staying with you until you are done with detox. · Put a list of emergency numbers near the phone. This should include your doctor, the police, the nearest hospital and emergency room, and neighbors who can help if needed. · Make sure all alcohol is removed from the house before you start. This includes beverages as well as medicines, rubbing alcohol, and certain flavorings like vanilla extract. · Keep \"drinking buddies\" away during the time you are going through detox. · Make your surroundings calm. Soft lights, soft music, and a comfortable place to sit or lie down can help make the process easier. · Drink lots of fluids and eat snacks such as fruit, cheese and crackers, and pretzels. Foods high in carbohydrate may help reduce the craving for alcohol. · Understand that detox is going to be hard. · Keep in mind that the people watching over you during detox are there to help. Explain to them before you start that you may not act like yourself until detox is finished. · Consider joining a support group such as Alcoholics Anonymous. Sharing your experiences with other people who face similar challenges may help you feel less overwhelmed. · Keep the numbers for these national suicide hotlines: 1-230-537-TALK (8-239.659.2720) and 9-067-RKYEIKZ (6-935.547.1866). If you or someone you know talks about suicide or feeling hopeless, get help right away. When should you call for help? Call 911 anytime you think you may need emergency care.  For example, call if:    · You feel you cannot stop from hurting yourself or someone else.     · You vomit many times and cannot stop.     · You vomit blood or what looks like coffee grounds.     · You have trouble breathing or are breathing very fast.     · Your heart beats more than 120 times a minute and will not slow down.     · You have chest pain.     · You have a seizure.     · You see or feel things that are not there (hallucinate). If you are caring for someone who is going through detox, call if:    · The person passes out (loses consciousness).     · The person sees or feels things that are not there and sees or hears the same things many times.     · The person is very agitated and will not calm down.     · The person becomes violent or threatens to be violent.     · The person has a seizure. Call your doctor now or seek immediate medical care if:    · You have a high fever.     · You have severe belly pain.     · You are very shaky. Watch closely for changes in your health, and be sure to contact your doctor if:    · You do not get better as expected. Where can you learn more? Go to http://www.pearson.com/  Enter D051 in the search box to learn more about \"Alcohol Detoxification and Withdrawal: Care Instructions. \"  Current as of: February 11, 2021               Content Version: 13.0  © 2006-2021 Healthwise, Incorporated. Care instructions adapted under license by [employer] (which disclaims liability or warranty for this information). If you have questions about a medical condition or this instruction, always ask your healthcare professional. [address] any warranty or liability for your use of this information.

## 2021-12-17 NOTE — PROGRESS NOTES
HISTORY OF PRESENT ILLNESS  Keeley Schmitt is a 45 y.o. male. Patient denies significant chest pain, SOB, palpitations, edema, dizziness  Follow-up of hypertension, hyperlipidemia, alcohol abuse    12/21 no daytime shortness of breath but feels wheezy at night when he is laying down. Feels better when he sits up  5/17 d/nkechi fenofibrate due to rectal odor without incontinence  11/16 thinks that he is getting mild bronchitis for last few days- improving  10/18 - Doing well, no complaints at this time. Denies chest pain, sob, orthopnea, edema, palpitations or dizziness. Shortness of Breath  The history is provided by the patient. This is a new problem. The problem occurs intermittently. The current episode started more than 1 week ago. The problem has not changed since onset. Associated symptoms include orthopnea. Pertinent negatives include no fever, no headaches, no cough, no wheezing, no PND, no chest pain, no vomiting, no rash, no leg swelling and no claudication. The problem's precipitants include exercise (working in hot weather; carrying heavy stuff and yard work, 2 flights steps; ). Follow-up  Associated symptoms include shortness of breath. Pertinent negatives include no chest pain and no headaches. Review of Systems   Constitutional: Negative for chills, diaphoresis, fever, malaise/fatigue and weight loss. HENT: Negative for nosebleeds. Eyes: Negative for discharge. Respiratory: Positive for shortness of breath. Negative for cough and wheezing. Cardiovascular: Positive for orthopnea. Negative for chest pain, palpitations, claudication, leg swelling and PND. Gastrointestinal: Negative for diarrhea, nausea and vomiting. Genitourinary: Negative for dysuria and hematuria. Musculoskeletal: Negative for joint pain. Skin: Negative for rash. Neurological: Negative for dizziness, seizures, loss of consciousness and headaches. Endo/Heme/Allergies: Negative for polydipsia.  Does not bruise/bleed easily. Psychiatric/Behavioral: Negative for depression and substance abuse. The patient does not have insomnia. Allergies   Allergen Reactions    Ace Inhibitors Angioedema    Lisinopril Other (comments)     Lip swelling    Pcn [Penicillins] Rash       Past Medical History:   Diagnosis Date    Essential hypertension with goal blood pressure less than 130/80 2016    Hyperlipemia 2016       Family History   Problem Relation Age of Onset    Heart Attack Neg Hx     Stroke Neg Hx        Social History     Tobacco Use    Smoking status: Former Smoker     Types: Pipe     Quit date: 2002     Years since quittin.5    Smokeless tobacco: Never Used   Vaping Use    Vaping Use: Never used   Substance Use Topics    Alcohol use: Yes     Alcohol/week: 57.0 standard drinks     Types: 36 Standard drinks or equivalent, 21 Cans of beer per week     Comment: moderate use 2 beers per day and 4 mixed drinks     Drug use: No        Current Outpatient Medications   Medication Sig    fenofibrate nanocrystallized (TRICOR) 145 mg tablet Take 1 Tablet by mouth daily.  hydroCHLOROthiazide (HYDRODIURIL) 25 mg tablet Take 1 Tablet by mouth daily.  atorvastatin (LIPITOR) 10 mg tablet TAKE ONE TABLET BY MOUTH DAILY    metoprolol tartrate (LOPRESSOR) 25 mg tablet TAKE ONE TABLET BY MOUTH TWICE A DAY    amLODIPine (NORVASC) 10 mg tablet TAKE ONE TABLET BY MOUTH DAILY     No current facility-administered medications for this visit. No past surgical history on file. Diagnostic Studies: Old records reviewed and show as follows  EKG tracings reviewed by me today. No flowsheet data found.  ECHO  SUMMARY:  Left ventricle: Systolic function was normal. Ejection fraction was  estimated in the range of 55 % to 60 %. There were no regional wall motion  abnormalities. Wall thickness was mildly increased. Right ventricle: The ventricle was mildly dilated. Mitral valve:  There was trivial regurgitation. Tricuspid valve: Tricuspid regurgitation peak velocity: 2 m/sec. Pulmonary  artery systolic pressure: 19 mmHg. 6/16 TMT  Conclusion:    1. No ischemic ST-T changes at 100% of predicted maximal heart rate.     2. Normal functional capacity.    3. Appropriate heart rate and moderately hypertensive blood pressure response with baseline hypertension.     4. The patient will require medications for hypertension and Lisinopril 10 mg a day was given today.  Follow up BMP.  Side effects were explained including cough.     5. Clinical correlation is suggested  12/20 echo  Interpretation Summary    · LV: Estimated LVEF is 55 - 60%. Normal cavity size, systolic function (ejection fraction normal) and diastolic function. Mild concentric hypertrophy. Wall motion: normal.  · LA: Left Atrium volume index is 30.5 mL/m2. · RV: Normal right ventricular size and function. · No hemodynamically significant valvular pathology. Comparison Study Information    Prior Study    When compared to the previous study from 6/15/16, the right ventricle size now appears normal.       Visit Vitals  /80 (BP 1 Location: Left upper arm, BP Patient Position: Sitting, BP Cuff Size: Adult)   Pulse 62   Ht 5' 7\" (1.702 m)   Wt 96.6 kg (213 lb)   SpO2 98%   BMI 33.36 kg/m²       Mr. Maribel Lincoln has a reminder for a \"due or due soon\" health maintenance. I have asked that he contact his primary care provider for follow-up on this health maintenance. Physical Exam  Constitutional:       General: He is not in acute distress. Appearance: He is well-developed. He is obese. HENT:      Head: Normocephalic and atraumatic. Mouth/Throat:      Dentition: Normal dentition. Eyes:      General: No scleral icterus. Right eye: No discharge. Left eye: No discharge. Neck:      Thyroid: No thyromegaly. Vascular: No carotid bruit or JVD.    Cardiovascular:      Rate and Rhythm: Normal rate and regular rhythm. Pulses: Intact distal pulses. Heart sounds: Normal heart sounds, S1 normal and S2 normal. No murmur heard. No friction rub. No gallop. Pulmonary:      Effort: Pulmonary effort is normal.      Breath sounds: Normal breath sounds. No wheezing or rales. Abdominal:      Palpations: Abdomen is soft. There is no mass. Tenderness: There is no abdominal tenderness. Musculoskeletal:      Cervical back: Neck supple. Right lower leg: Edema (1) present. Left lower leg: Edema (1-2) present. Lymphadenopathy:      Cervical:      Right cervical: No superficial cervical adenopathy. Left cervical: No superficial cervical adenopathy. Skin:     General: Skin is warm and dry. Findings: No rash. Neurological:      Mental Status: He is alert and oriented to person, place, and time. Psychiatric:         Behavior: Behavior normal.         ASSESSMENT and PLAN    Patient advised to take alcohol in moderation to avoid future cardiac and liver problems including arrhythmias, cardiomyopathy and congestive heart failure. HLD: Results for Grecia Bhatia (MRN [id number]) as of 6/18/2021 08:44   Ref. Range 11/30/2020 07:18   Triglyceride Latest Ref Range: <150 MG/ (H)   Cholesterol, total Latest Ref Range: <200 MG/ (H)   HDL Cholesterol Latest Ref Range: 40 - 60 MG/DL 57   CHOL/HDL Ratio Latest Ref Range: 0 - 5.0   4.4   VLDL, calculated Latest Units: MG/DL 36.6   LDL, calculated Latest Ref Range: 0 - 100 MG/.4 (H)         12/20 patient continues to drink heavy alcohol on a daily basis. Blood pressure is mildly elevated. Increase HCTZ and follow home chart. Echo has shown normal ejection fraction and normalization of the right-sided chamber size. Patient has cut down on ham intake and edema is resolved. Continue same medications. Labs as ordered. Detailed discussion regarding reduction of alcohol.   He told me that he does not want to stop it but he will try reducing it. He understands that alcohol will depress the heart muscle causing reduced function and irregular heartbeats as well along with liver disease. 6/18/2021 blood pressure is controlled. Patient has cut down alcohol significantly and now drinks only beer. He seemed inclined to reduce the beer now as well and he was encouraged to do that. Lipids are not controlled but will repeat a test before adjusting medications as he has cut down his alcohol significantly. Mediterranean diet guidelines printed. Diagnoses and all orders for this visit:    1. Essential hypertension with goal blood pressure less than 130/80  -     AMB POC EKG ROUTINE W/ 12 LEADS, INTER & REP    2. Hypercholesteremia    3. Alcohol abuse    4. Orthopnea  -     ECHO ADULT COMPLETE; Future        Pertinent laboratory and test data reviewed and discussed with patient. See patient instructions also for other medical advice given    There are no discontinued medications. Follow-up and Dispositions    · Return in about 3 months (around 3/17/2022), or if symptoms worsen or fail to improve, for post test.       12/17/2021 blood pressure is controlled. Lipids showed improving LDL but triglycerides are worse, likely secondary to alcohol abuse. Discussed in detail regarding reducing alcohol further. He will try. He is developing orthopnea and there is mild edema indicating early CHF. Check echo.

## 2021-12-17 NOTE — PROGRESS NOTES
1. Have you been to the ER, urgent care clinic since your last visit? Hospitalized since your last visit? No    2. Have you seen or consulted any other health care providers outside of the [address] [place] [address] since your last visit? Include any pap smears or colon screening. No    3. Since your last visit, have you had any of the following symptoms? NO.     4.  Have you had any blood work, X-rays or cardiac testing? Yes Where: [facility]     Requested: NO     In [facility]: YES    5. Where do you normally have your labs drawn? [facility]    6. Do you need any refills today?    No

## 2022-01-03 DIAGNOSIS — I10 ESSENTIAL HYPERTENSION: ICD-10-CM

## 2022-01-03 RX ORDER — METOPROLOL TARTRATE 25 MG/1
TABLET, FILM COATED ORAL
Qty: 60 TABLET | Refills: 4 | Status: SHIPPED | OUTPATIENT
Start: 2022-01-03 | End: 2022-06-17

## 2022-03-19 PROBLEM — T78.3XXA ANGIOEDEMA: Status: ACTIVE | Noted: 2019-04-12

## 2022-05-02 ENCOUNTER — OFFICE VISIT (OUTPATIENT)
Dept: CARDIOLOGY CLINIC | Age: 52
End: 2022-05-02
Payer: COMMERCIAL

## 2022-05-02 VITALS
SYSTOLIC BLOOD PRESSURE: 128 MMHG | BODY MASS INDEX: 32.65 KG/M2 | DIASTOLIC BLOOD PRESSURE: 81 MMHG | HEIGHT: 67 IN | WEIGHT: 208 LBS | OXYGEN SATURATION: 97 % | HEART RATE: 75 BPM

## 2022-05-02 DIAGNOSIS — E78.00 HYPERCHOLESTEREMIA: ICD-10-CM

## 2022-05-02 DIAGNOSIS — I34.0 NONRHEUMATIC MITRAL VALVE REGURGITATION: ICD-10-CM

## 2022-05-02 DIAGNOSIS — F10.10 ALCOHOL ABUSE: ICD-10-CM

## 2022-05-02 DIAGNOSIS — I50.32 CHRONIC DIASTOLIC CONGESTIVE HEART FAILURE (HCC): Primary | ICD-10-CM

## 2022-05-02 DIAGNOSIS — I10 ESSENTIAL HYPERTENSION: ICD-10-CM

## 2022-05-02 PROCEDURE — 99214 OFFICE O/P EST MOD 30 MIN: CPT | Performed by: INTERNAL MEDICINE

## 2022-05-02 NOTE — PATIENT INSTRUCTIONS
There are no discontinued medications. Avoiding Triggers With Heart Failure: Care Instructions  Your Care Instructions     Triggers are anything that make your heart failure flare up. A flare-up is also called \"sudden heart failure\" or \"acute heart failure. \" When you have a flare-up, fluid builds up in your lungs, and you have problems breathing. You might need to go to the hospital. By watching for changes in your condition and avoiding triggers, you can prevent heart failure flare-ups. Follow-up care is a key part of your treatment and safety. Be sure to make and go to all appointments, and call your doctor if you are having problems. It's also a good idea to know your test results and keep a list of the medicines you take. How can you care for yourself at home? Watch for changes in your weight and condition  · Weigh yourself without clothing at the same time each day. Record your weight. Call your doctor if you have sudden weight gain, such as more than 2 to 3 pounds in a day or 5 pounds in a week. (Your doctor may suggest a different range of weight gain.) A sudden weight gain may mean that your heart failure is getting worse. · Keep a daily record of your symptoms. Write down any changes in how you feel, such as new shortness of breath, cough, or problems eating. Also record if your ankles are more swollen than usual and if you feel more tired than usual. Note anything that you ate or did that could have triggered these changes. Limit sodium  Sodium causes your body to hold on to extra water. This may cause your heart failure symptoms to get worse. People get most of their sodium from processed foods. Fast food and restaurant meals also tend to be very high in sodium. · Your doctor may suggest that you limit sodium. Your doctor can tell you how much sodium is right for you. This includes limiting sodium in cooked and packaged foods. · Read food labels on cans and food packages.  They tell you how much sodium you get in one serving. Check the serving size. If you eat more than one serving, you are getting more sodium. · Be aware that sodium can come in forms other than salt, including monosodium glutamate (MSG), sodium citrate, and sodium bicarbonate (baking soda). MSG is often added to Asian food. You can sometimes ask for food without MSG or salt. · Slowly reducing salt will help you adjust to the taste. Take the salt shaker off the table. · Flavor your food with garlic, lemon juice, onion, vinegar, herbs, and spices instead of salt. Do not use soy sauce, steak sauce, onion salt, garlic salt, mustard, or ketchup on your food, unless it is labeled \"low-sodium\" or \"low-salt. \"  · Make your own salad dressings, sauces, and ketchup without adding salt. · Use fresh or frozen ingredients, instead of canned ones, whenever you can. Choose low-sodium canned goods. · Eat less processed food and food from restaurants, including fast food. Exercise as directed  Moderate, regular exercise is very good for your heart. It improves your blood flow and helps control your weight. But too much exercise can stress your heart and cause a heart failure flare-up. · Check with your doctor before you start an exercise program.  · Walking is an easy way to get exercise. Start out slowly. Gradually increase the length and pace of your walk. Swimming, riding a bike, and using a treadmill are also good forms of exercise. · When you exercise, watch for signs that your heart is working too hard. You are pushing yourself too hard if you cannot talk while you are exercising. If you become short of breath or dizzy or have chest pain, stop, sit down, and rest.  · Do not exercise when you do not feel well. Take medicines correctly  · Take your medicines exactly as prescribed. Call your doctor if you think you are having a problem with your medicine. · Make a list of all the medicines you take.  Include those prescribed to you by other doctors and any over-the-counter medicines, vitamins, or supplements you take. Take this list with you when you go to any doctor. · Take your medicines at the same time every day. It may help you to post a list of all the medicines you take every day and what time of day you take them. · Make taking your medicine as simple as you can. Plan times to take your medicines when you are doing other things, such as eating a meal or getting ready for bed. This will make it easier to remember to take your medicines. · Get organized. Use helpful tools, such as daily or weekly pill containers. When should you call for help? Call 911  if you have symptoms of sudden heart failure such as:    · You have severe trouble breathing.     · You cough up pink, foamy mucus.     · You have a new irregular or rapid heartbeat. Call your doctor now or seek immediate medical care if:    · You have new or increased shortness of breath.     · You are dizzy or lightheaded, or you feel like you may faint.     · You have sudden weight gain, such as more than 2 to 3 pounds in a day or 5 pounds in a week. (Your doctor may suggest a different range of weight gain.)     · You have increased swelling in your legs, ankles, or feet.     · You are suddenly so tired or weak that you cannot do your usual activities. Watch closely for changes in your health, and be sure to contact your doctor if you develop new symptoms. Where can you learn more? Go to http://www.gray.com/  Enter V089 in the search box to learn more about \"Avoiding Triggers With Heart Failure: Care Instructions. \"  Current as of: January 10, 2022               Content Version: 13.2  © 6838-1576 [employer]. Care instructions adapted under license by [employer] (which disclaims liability or warranty for this information).  If you have questions about a medical condition or this instruction, always ask your healthcare professional. [employer] disclaims any warranty or liability for your use of this information.

## 2022-05-02 NOTE — PROGRESS NOTES
HISTORY OF PRESENT ILLNESS  Alen Fung is a 45 y.o. male. Patient denies significant chest pain, SOB, palpitations, edema, dizziness  Follow-up of hypertension, hyperlipidemia, alcohol abuse      5/17 d/nkechi fenofibrate due to rectal odor without incontinence  11/16 thinks that he is getting mild bronchitis for last few days- improving  10/18 - Doing well, no complaints at this time. Denies chest pain, sob, orthopnea, edema, palpitations or dizziness. 12/21 no daytime shortness of breath but feels wheezy at night when he is laying down. Feels better when he sits up    Follow-up  Associated symptoms include shortness of breath. Pertinent negatives include no chest pain and no headaches. Shortness of Breath  The history is provided by the patient. This is a new problem. The problem occurs intermittently. The current episode started more than 1 week ago. The problem has not changed since onset. Associated symptoms include orthopnea (2 pillow- only sometime orthopnea). Pertinent negatives include no fever, no headaches, no cough, no wheezing, no PND, no chest pain, no vomiting, no rash, no leg swelling and no claudication. The problem's precipitants include exercise (working in hot weather; carrying heavy stuff and yard work, 2 flights steps; ). Review of Systems   Constitutional: Negative for chills, diaphoresis, fever, malaise/fatigue and weight loss. HENT: Negative for nosebleeds. Eyes: Negative for discharge. Respiratory: Positive for shortness of breath. Negative for cough and wheezing. Cardiovascular: Positive for orthopnea (2 pillow- only sometime orthopnea). Negative for chest pain, palpitations, claudication, leg swelling and PND. Gastrointestinal: Negative for diarrhea, nausea and vomiting. Genitourinary: Negative for dysuria and hematuria. Musculoskeletal: Negative for joint pain. Skin: Negative for rash.    Neurological: Negative for dizziness, seizures, loss of consciousness and headaches. Endo/Heme/Allergies: Negative for polydipsia. Does not bruise/bleed easily. Psychiatric/Behavioral: Negative for depression and substance abuse. The patient does not have insomnia. Allergies   Allergen Reactions    Ace Inhibitors Angioedema    Lisinopril Other (comments)     Lip swelling    Pcn [Penicillins] Rash       Past Medical History:   Diagnosis Date    Essential hypertension with goal blood pressure less than 130/80 2016    Hyperlipemia 2016       Family History   Problem Relation Age of Onset    Heart Attack Neg Hx     Stroke Neg Hx        Social History     Tobacco Use    Smoking status: Former Smoker     Types: Pipe     Quit date: 2002     Years since quittin.9    Smokeless tobacco: Never Used   Vaping Use    Vaping Use: Never used   Substance Use Topics    Alcohol use: Yes     Alcohol/week: 57.0 standard drinks     Types: 36 Standard drinks or equivalent, 21 Cans of beer per week     Comment: moderate use 2 beers per day and 4 mixed drinks     Drug use: No        Current Outpatient Medications   Medication Sig    metoprolol tartrate (LOPRESSOR) 25 mg tablet TAKE ONE TABLET BY MOUTH TWICE A DAY    fenofibrate nanocrystallized (TRICOR) 145 mg tablet Take 1 Tablet by mouth daily.  hydroCHLOROthiazide (HYDRODIURIL) 25 mg tablet Take 1 Tablet by mouth daily.  atorvastatin (LIPITOR) 10 mg tablet TAKE ONE TABLET BY MOUTH DAILY    amLODIPine (NORVASC) 10 mg tablet TAKE ONE TABLET BY MOUTH DAILY     No current facility-administered medications for this visit. No past surgical history on file. Diagnostic Studies: Old records reviewed and show as follows  EKG tracings reviewed by me today. No flowsheet data found.  ECHO  SUMMARY:  Left ventricle: Systolic function was normal. Ejection fraction was  estimated in the range of 55 % to 60 %. There were no regional wall motion  abnormalities.  Wall thickness was mildly increased. Right ventricle: The ventricle was mildly dilated. Mitral valve: There was trivial regurgitation. Tricuspid valve: Tricuspid regurgitation peak velocity: 2 m/sec. Pulmonary  artery systolic pressure: 19 mmHg. 6/16 TMT  Conclusion:    1. No ischemic ST-T changes at 100% of predicted maximal heart rate.     2. Normal functional capacity.    3. Appropriate heart rate and moderately hypertensive blood pressure response with baseline hypertension.     4. The patient will require medications for hypertension and Lisinopril 10 mg a day was given today.  Follow up BMP.  Side effects were explained including cough.     5. Clinical correlation is suggested  12/20 echo  Interpretation Summary    · LV: Estimated LVEF is 55 - 60%. Normal cavity size, systolic function (ejection fraction normal) and diastolic function. Mild concentric hypertrophy. Wall motion: normal.  · LA: Left Atrium volume index is 30.5 mL/m2. · RV: Normal right ventricular size and function. · No hemodynamically significant valvular pathology. Comparison Study Information    Prior Study    When compared to the previous study from 6/15/16, the right ventricle size now appears normal.       Visit Vitals  /81 (BP 1 Location: Left upper arm, BP Patient Position: Sitting, BP Cuff Size: Adult)   Pulse 75   Ht 5' 7\" (1.702 m)   Wt 94.3 kg (208 lb)   SpO2 97%   BMI 32.58 kg/m²       Mr. Flory Hanson has a reminder for a \"due or due soon\" health maintenance. I have asked that he contact his primary care provider for follow-up on this health maintenance. Physical Exam  Constitutional:       General: He is not in acute distress. Appearance: He is well-developed. He is obese. HENT:      Head: Normocephalic and atraumatic. Mouth/Throat:      Dentition: Normal dentition. Eyes:      General: No scleral icterus. Right eye: No discharge. Left eye: No discharge. Neck:      Thyroid: No thyromegaly.       Vascular: No carotid bruit or JVD. Cardiovascular:      Rate and Rhythm: Normal rate and regular rhythm. Pulses: Intact distal pulses. Heart sounds: Normal heart sounds, S1 normal and S2 normal. No murmur heard. No friction rub. No gallop. Pulmonary:      Effort: Pulmonary effort is normal.      Breath sounds: Normal breath sounds. No wheezing or rales. Abdominal:      Palpations: Abdomen is soft. There is no mass. Tenderness: There is no abdominal tenderness. Musculoskeletal:      Cervical back: Neck supple. Right lower leg: Edema (trace) present. Left lower leg: Edema (Trace to 1+) present. Lymphadenopathy:      Cervical:      Right cervical: No superficial cervical adenopathy. Left cervical: No superficial cervical adenopathy. Skin:     General: Skin is warm and dry. Findings: No rash. Neurological:      Mental Status: He is alert and oriented to person, place, and time. Psychiatric:         Behavior: Behavior normal.         ASSESSMENT and PLAN    Patient advised to take alcohol in moderation to avoid future cardiac and liver problems including arrhythmias, cardiomyopathy and congestive heart failure. HLD: Results for Stephen Milan (MRN [id number]) as of 5/2/2022 13:58   Ref. Range 11/30/2020 07:18 9/1/2021 07:01   Triglyceride Latest Ref Range: <150 MG/ (H) 280 (H)   Cholesterol, total Latest Ref Range: <200 MG/ (H) 228 (H)   HDL Cholesterol Latest Ref Range: 40 - 60 MG/DL 57 62 (H)   CHOL/HDL Ratio Latest Ref Range: 0 - 5.0   4.4 3.7   VLDL, calculated Latest Units: MG/DL 36.6 56   LDL, calculated Latest Ref Range: 0 - 100 MG/.4 (H) 110 (H)           12/20 patient continues to drink heavy alcohol on a daily basis. Blood pressure is mildly elevated. Increase HCTZ and follow home chart. Echo has shown normal ejection fraction and normalization of the right-sided chamber size. Patient has cut down on ham intake and edema is resolved.   Continue same medications. Labs as ordered. Detailed discussion regarding reduction of alcohol. He told me that he does not want to stop it but he will try reducing it. He understands that alcohol will depress the heart muscle causing reduced function and irregular heartbeats as well along with liver disease. 6/18/2021 blood pressure is controlled. Patient has cut down alcohol significantly and now drinks only beer. He seemed inclined to reduce the beer now as well and he was encouraged to do that. Lipids are not controlled but will repeat a test before adjusting medications as he has cut down his alcohol significantly. Mediterranean diet guidelines printed. 12/17/2021 blood pressure is controlled. Lipids showed improving LDL but triglycerides are worse, likely secondary to alcohol abuse. Discussed in detail regarding reducing alcohol further. He will try. He is developing orthopnea and there is mild edema indicating early CHF. Check echo. Diagnoses and all orders for this visit:    1. Chronic diastolic congestive heart failure ([address]    2. Essential hypertension    3. Hypercholesteremia    4. Alcohol abuse    5. Nonrheumatic mitral valve regurgitation        Pertinent laboratory and test data reviewed and discussed with patient. See patient instructions also for other medical advice given    There are no discontinued medications. Follow-up and Dispositions    · Return in about 6 months (around 11/2/2022), or if symptoms worsen or fail to improve. 5/2/2022 echo has shown normal ejection fraction but moderate LVH and mild MR. Patient is in chronic diastolic CHF. Acute component of CHF seems to be improving his edema is less today. He has lost a few pounds. Blood pressure is controlled  Continues to drink alcohol excessively in the amount of about 4 beers a day. Detailed discussion regarding alcohol management. He will try.   Lipids are improving but not at goal.  Hopefully he can follow the diet and continue the medications. Elevated triglycerides also secondary to alcohol abuse.

## 2022-05-02 NOTE — PROGRESS NOTES
1. Have you been to the ER, urgent care clinic since your last visit? Hospitalized since your last visit? No    2. Have you seen or consulted any other health care providers outside of the [address] [place] [address] since your last visit? Include any pap smears or colon screening. No    3. Since your last visit, have you had any of the following symptoms? No    4. Have you had any blood work, X-rays or cardiac testing? No     Requested: NO     In [facility]: YES    5. Where do you normally have your labs drawn? [facility]    6. Do you need any refills today?    No

## 2022-06-16 DIAGNOSIS — I10 ESSENTIAL HYPERTENSION WITH GOAL BLOOD PRESSURE LESS THAN 130/80: ICD-10-CM

## 2022-06-16 DIAGNOSIS — I10 ESSENTIAL HYPERTENSION: ICD-10-CM

## 2022-06-16 RX ORDER — AMLODIPINE BESYLATE 10 MG/1
TABLET ORAL
Qty: 90 TABLET | Refills: 3 | Status: SHIPPED | OUTPATIENT
Start: 2022-06-16

## 2022-06-17 RX ORDER — HYDROCHLOROTHIAZIDE 25 MG/1
TABLET ORAL
Qty: 90 TABLET | Refills: 1 | Status: SHIPPED | OUTPATIENT
Start: 2022-06-17

## 2022-06-17 RX ORDER — METOPROLOL TARTRATE 25 MG/1
TABLET, FILM COATED ORAL
Qty: 60 TABLET | Refills: 4 | Status: SHIPPED | OUTPATIENT
Start: 2022-06-17

## 2022-07-26 NOTE — ROUTINE PROCESS
Transportation here with W/C for transfer to [place] [id number] via W/C with all personal belongings including cell phone and clothing. No sign of distress noted. Patient on room air at 96-97%. Topical Retinoid counseling:  Patient advised to apply a pea-sized amount only at bedtime and wait 30 minutes after washing their face before applying.  If too drying, patient may add a non-comedogenic moisturizer. The patient verbalized understanding of the proper use and possible adverse effects of retinoids.  All of the patient's questions and concerns were addressed.

## 2022-10-16 ENCOUNTER — HOSPITAL ENCOUNTER (EMERGENCY)
Age: 52
Discharge: HOME | End: 2022-10-16
Attending: EMERGENCY MEDICINE

## 2022-10-16 ENCOUNTER — APPOINTMENT (OUTPATIENT)
Dept: GENERAL RADIOLOGY | Age: 52
End: 2022-10-16
Attending: EMERGENCY MEDICINE

## 2022-10-16 VITALS
DIASTOLIC BLOOD PRESSURE: 88 MMHG | OXYGEN SATURATION: 100 % | RESPIRATION RATE: 22 BRPM | TEMPERATURE: 98.9 F | SYSTOLIC BLOOD PRESSURE: 145 MMHG | HEART RATE: 110 BPM

## 2022-10-16 DIAGNOSIS — U07.1 COVID: Primary | ICD-10-CM

## 2022-10-16 LAB
FLUAV RNA SPEC QL NAA+PROBE: NOT DETECTED
FLUBV RNA SPEC QL NAA+PROBE: NOT DETECTED
SARS-COV-2, COV2: DETECTED

## 2022-10-16 PROCEDURE — 87636 SARSCOV2 & INF A&B AMP PRB: CPT

## 2022-10-16 PROCEDURE — 93005 ELECTROCARDIOGRAM TRACING: CPT

## 2022-10-16 PROCEDURE — 71046 X-RAY EXAM CHEST 2 VIEWS: CPT

## 2022-10-16 PROCEDURE — 99285 EMERGENCY DEPT VISIT HI MDM: CPT

## 2022-10-16 RX ORDER — NIRMATRELVIR AND RITONAVIR 150-100 MG
2 KIT ORAL EVERY 12 HOURS
Qty: 1 BOX | Refills: 0 | Status: SHIPPED | OUTPATIENT
Start: 2022-10-16 | End: 2022-10-21

## 2022-10-16 RX ORDER — ALBUTEROL SULFATE 90 UG/1
2 AEROSOL, METERED RESPIRATORY (INHALATION)
Qty: 18 G | Refills: 0 | Status: SHIPPED | OUTPATIENT
Start: 2022-10-16

## 2022-10-16 NOTE — ED PROVIDER NOTES
41-year-old male past medical history of hypertension and hyperlipidemia presents to the emergency department with cough shortness of breath and chest pain. Patient stated on Friday at 3 PM while at work he started coughing and having green sputum. This continued yesterday except he had fatigue. Patient today has shortness of breath no reported COVID exposures he has chest pain and back pain with cough. Nothing makes it feel better or worse. No treatment with medications. Patient also describes himself as a alcoholic. Patient has no abdominal pain or diarrhea. He has been dry heaving. No other issues expressed. Past Medical History:   Diagnosis Date    Essential hypertension with goal blood pressure less than 130/80 2016    Hyperlipemia 2016       No past surgical history on file. Family History:   Problem Relation Age of Onset    Heart Attack Neg Hx     Stroke Neg Hx        Social History     Socioeconomic History    Marital status:      Spouse name: Not on file    Number of children: Not on file    Years of education: Not on file    Highest education level: Not on file   Occupational History    Not on file   Tobacco Use    Smoking status: Former     Types: Pipe     Quit date: 2002     Years since quittin.4    Smokeless tobacco: Never   Vaping Use    Vaping Use: Never used   Substance and Sexual Activity    Alcohol use:  Yes     Alcohol/week: 57.0 standard drinks     Types: 36 Standard drinks or equivalent, 21 Cans of beer per week     Comment: moderate use 2 beers per day and 4 mixed drinks     Drug use: No    Sexual activity: Yes     Partners: Female   Other Topics Concern    Not on file   Social History Narrative    Not on file     Social Determinants of Health     Financial Resource Strain: Not on file   Food Insecurity: Not on file   Transportation Needs: Not on file   Physical Activity: Not on file   Stress: Not on file   Social Connections: Not on file   Intimate Partner Violence: Not on file   Housing Stability: Not on file         ALLERGIES: Ace inhibitors, Lisinopril, and Pcn [penicillins]    Review of Systems   Constitutional: Negative. Respiratory:  Positive for cough and shortness of breath. Negative for apnea, choking, chest tightness, wheezing and stridor. Cardiovascular: Negative. Gastrointestinal:  Positive for nausea and vomiting. Negative for abdominal distention, abdominal pain, anal bleeding, blood in stool, constipation, diarrhea and rectal pain. Musculoskeletal: Negative. Neurological: Negative. Hematological: Negative. Psychiatric/Behavioral: Negative. All other systems reviewed and are negative. Vitals:    10/16/22 0958   BP: (!) 145/88   Pulse: (!) 110   Resp: 22   Temp: 98.9 °F (37.2 °C)   SpO2: 100%            Physical Exam  Vitals and nursing note reviewed. Constitutional:       General: He is not in acute distress. Appearance: Normal appearance. He is not ill-appearing, toxic-appearing or diaphoretic. HENT:      Head: Normocephalic and atraumatic. Nose: Nose normal.      Mouth/Throat:      Mouth: Mucous membranes are moist.      Pharynx: No oropharyngeal exudate or posterior oropharyngeal erythema. Eyes:      Extraocular Movements: Extraocular movements intact. Cardiovascular:      Rate and Rhythm: Normal rate and regular rhythm. Pulses: Normal pulses. Heart sounds: Normal heart sounds. Pulmonary:      Effort: Pulmonary effort is normal. No respiratory distress. Breath sounds: Normal breath sounds. No stridor. No wheezing, rhonchi or rales. Chest:      Chest wall: No tenderness. Abdominal:      General: There is no distension. Palpations: Abdomen is soft. There is no mass. Tenderness: There is no abdominal tenderness. There is no right CVA tenderness, left CVA tenderness, guarding or rebound. Hernia: No hernia is present.    Musculoskeletal:         General: Normal range of motion. Cervical back: Normal range of motion and neck supple. No rigidity. Skin:     General: Skin is warm. Capillary Refill: Capillary refill takes less than 2 seconds. Neurological:      General: No focal deficit present. Mental Status: He is alert. Psychiatric:         Mood and Affect: Mood normal.         Behavior: Behavior normal.        MDM  Number of Diagnoses or Management Options  Diagnosis management comments: Patient is COVID-positive. He is tachycardic however sats 100%. Sats on room air. Patient is in no acute distress nontoxic in appearance. I canceled his labs will look x-ray and discharge patient home.     Differential diagnosis COVID COVID-pneumonia chest pain ACS PE           Procedures

## 2022-10-16 NOTE — DISCHARGE INSTRUCTIONS
Come back if you get worse. Follow-up without fail.   If drink plenty of fluids use Motrin and Tylenol as needed

## 2022-10-17 LAB
ATRIAL RATE: 112 BPM
CALCULATED P AXIS, ECG09: 83 DEGREES
CALCULATED R AXIS, ECG10: -70 DEGREES
CALCULATED T AXIS, ECG11: 15 DEGREES
DIAGNOSIS, 93000: NORMAL
P-R INTERVAL, ECG05: 230 MS
Q-T INTERVAL, ECG07: 422 MS
QRS DURATION, ECG06: 134 MS
QTC CALCULATION (BEZET), ECG08: 576 MS
VENTRICULAR RATE, ECG03: 112 BPM

## 2022-11-14 ENCOUNTER — OFFICE VISIT (OUTPATIENT)
Dept: CARDIOLOGY CLINIC | Age: 52
End: 2022-11-14
Payer: MEDICARE

## 2022-11-14 VITALS
WEIGHT: 204 LBS | DIASTOLIC BLOOD PRESSURE: 84 MMHG | OXYGEN SATURATION: 98 % | SYSTOLIC BLOOD PRESSURE: 136 MMHG | BODY MASS INDEX: 32.02 KG/M2 | HEIGHT: 67 IN | HEART RATE: 63 BPM

## 2022-11-14 DIAGNOSIS — I10 ESSENTIAL HYPERTENSION: Primary | ICD-10-CM

## 2022-11-14 DIAGNOSIS — I34.0 NONRHEUMATIC MITRAL VALVE REGURGITATION: ICD-10-CM

## 2022-11-14 DIAGNOSIS — F10.10 ALCOHOL ABUSE: ICD-10-CM

## 2022-11-14 DIAGNOSIS — E78.00 HYPERCHOLESTEREMIA: ICD-10-CM

## 2022-11-14 PROCEDURE — 99213 OFFICE O/P EST LOW 20 MIN: CPT | Performed by: INTERNAL MEDICINE

## 2022-11-14 PROCEDURE — 3078F DIAST BP <80 MM HG: CPT | Performed by: INTERNAL MEDICINE

## 2022-11-14 PROCEDURE — G8752 SYS BP LESS 140: HCPCS | Performed by: INTERNAL MEDICINE

## 2022-11-14 PROCEDURE — G8417 CALC BMI ABV UP PARAM F/U: HCPCS | Performed by: INTERNAL MEDICINE

## 2022-11-14 PROCEDURE — G8427 DOCREV CUR MEDS BY ELIG CLIN: HCPCS | Performed by: INTERNAL MEDICINE

## 2022-11-14 PROCEDURE — G8432 DEP SCR NOT DOC, RNG: HCPCS | Performed by: INTERNAL MEDICINE

## 2022-11-14 PROCEDURE — G8754 DIAS BP LESS 90: HCPCS | Performed by: INTERNAL MEDICINE

## 2022-11-14 PROCEDURE — 3017F COLORECTAL CA SCREEN DOC REV: CPT | Performed by: INTERNAL MEDICINE

## 2022-11-14 PROCEDURE — 3074F SYST BP LT 130 MM HG: CPT | Performed by: INTERNAL MEDICINE

## 2022-11-14 NOTE — PROGRESS NOTES
HISTORY OF PRESENT ILLNESS  David Gusman is a 47 y.o. male. Patient denies significant chest pain, SOB, palpitations, edema, dizziness  Follow-up of hypertension, hyperlipidemia, alcohol abuse      5/17 d/nkechi fenofibrate due to rectal odor without incontinence  11/16 thinks that he is getting mild bronchitis for last few days- improving  10/18 - Doing well, no complaints at this time. Denies chest pain, sob, orthopnea, edema, palpitations or dizziness. 12/21 no daytime shortness of breath but feels wheezy at night when he is laying down. Feels better when he sits up    Follow-up  Pertinent negatives include no chest pain, no headaches and no shortness of breath. Shortness of Breath  The history is provided by the Patient. This is a new problem. The problem occurs intermittently. The current episode started more than 1 week ago. The problem has been resolved. Pertinent negatives include no fever, no headaches, no cough, no wheezing, no PND, no orthopnea, no chest pain, no vomiting, no rash, no leg swelling and no claudication. The problem's precipitants include exercise (working in hot weather; carrying heavy stuff and yard work, 2 flights steps; ). Review of Systems   Constitutional:  Negative for chills, diaphoresis, fever, malaise/fatigue and weight loss. HENT:  Negative for nosebleeds. Eyes:  Negative for discharge. Respiratory:  Negative for cough, shortness of breath and wheezing. Cardiovascular:  Negative for chest pain, palpitations, orthopnea, claudication, leg swelling and PND. Gastrointestinal:  Negative for diarrhea, nausea and vomiting. Genitourinary:  Negative for dysuria and hematuria. Musculoskeletal:  Negative for joint pain. Skin:  Negative for rash. Neurological:  Negative for dizziness, seizures, loss of consciousness and headaches. Endo/Heme/Allergies:  Negative for polydipsia. Does not bruise/bleed easily.    Psychiatric/Behavioral:  Negative for depression and substance abuse. The patient does not have insomnia. Allergies   Allergen Reactions    Ace Inhibitors Angioedema    Lisinopril Other (comments)     Lip swelling    Pcn [Penicillins] Rash       Past Medical History:   Diagnosis Date    Essential hypertension with goal blood pressure less than 130/80 2016    Hyperlipemia 2016       Family History   Problem Relation Age of Onset    Heart Attack Neg Hx     Stroke Neg Hx        Social History     Tobacco Use    Smoking status: Former     Types: Pipe     Quit date: 2002     Years since quittin.4    Smokeless tobacco: Never   Vaping Use    Vaping Use: Never used   Substance Use Topics    Alcohol use: Yes     Alcohol/week: 57.0 standard drinks     Types: 36 Standard drinks or equivalent, 21 Cans of beer per week     Comment: moderate use 2 beers per day and 4 mixed drinks     Drug use: No        Current Outpatient Medications   Medication Sig    albuterol (PROVENTIL HFA, VENTOLIN HFA, PROAIR HFA) 90 mcg/actuation inhaler Take 2 Puffs by inhalation every six (6) hours as needed for Wheezing. fenofibrate nanocrystallized (TRICOR) 145 mg tablet TAKE ONE TABLET BY MOUTH DAILY    metoprolol tartrate (LOPRESSOR) 25 mg tablet TAKE ONE TABLET BY MOUTH TWICE A DAY    hydroCHLOROthiazide (HYDRODIURIL) 25 mg tablet TAKE ONE TABLET BY MOUTH DAILY    amLODIPine (NORVASC) 10 mg tablet TAKE ONE TABLET BY MOUTH DAILY    atorvastatin (LIPITOR) 10 mg tablet TAKE ONE TABLET BY MOUTH DAILY     No current facility-administered medications for this visit. No past surgical history on file. Diagnostic Studies: Old records reviewed and show as follows  EKG tracings reviewed by me today. No flowsheet data found.  ECHO  SUMMARY:  Left ventricle: Systolic function was normal. Ejection fraction was  estimated in the range of 55 % to 60 %. There were no regional wall motion  abnormalities. Wall thickness was mildly increased. Right ventricle:  The ventricle was mildly dilated. Mitral valve: There was trivial regurgitation. Tricuspid valve: Tricuspid regurgitation peak velocity: 2 m/sec. Pulmonary  artery systolic pressure: 19 mmHg. 6/16 TMT  Conclusion:    No ischemic ST-T changes at 100% of predicted maximal heart rate. Normal functional capacity. Appropriate heart rate and moderately hypertensive blood pressure response with baseline hypertension. The patient will require medications for hypertension and Lisinopril 10 mg a day was given today. Follow up BMP. Side effects were explained including cough. Clinical correlation is suggested  12/20 echo  Interpretation Summary    LV: Estimated LVEF is 55 - 60%. Normal cavity size, systolic function (ejection fraction normal) and diastolic function. Mild concentric hypertrophy. Wall motion: normal.  LA: Left Atrium volume index is 30.5 mL/m2. RV: Normal right ventricular size and function. No hemodynamically significant valvular pathology. Comparison Study Information    Prior Study    When compared to the previous study from 6/15/16, the right ventricle size now appears normal.       Visit Vitals  /84   Pulse 63   Ht 5' 7\" (1.702 m)   Wt 92.5 kg (204 lb)   SpO2 98%   BMI 31.95 kg/m²       Mr. Renu Silveira has a reminder for a \"due or due soon\" health maintenance. I have asked that he contact his primary care provider for follow-up on this health maintenance. Physical Exam  Constitutional:       General: He is not in acute distress. Appearance: He is well-developed. He is obese. HENT:      Head: Normocephalic and atraumatic. Mouth/Throat:      Dentition: Normal dentition. Eyes:      General: No scleral icterus. Right eye: No discharge. Left eye: No discharge. Neck:      Thyroid: No thyromegaly. Vascular: No carotid bruit or JVD. Cardiovascular:      Rate and Rhythm: Normal rate and regular rhythm. Pulses: Intact distal pulses. Heart sounds: Normal heart sounds, S1 normal and S2 normal. No murmur heard. No friction rub. No gallop. Pulmonary:      Effort: Pulmonary effort is normal.      Breath sounds: Normal breath sounds. No wheezing or rales. Abdominal:      Palpations: Abdomen is soft. There is no mass. Tenderness: There is no abdominal tenderness. Musculoskeletal:      Cervical back: Neck supple. Right lower leg: No edema. Left lower leg: No edema. Lymphadenopathy:      Cervical:      Right cervical: No superficial cervical adenopathy. Left cervical: No superficial cervical adenopathy. Skin:     General: Skin is warm and dry. Findings: No rash. Neurological:      Mental Status: He is alert and oriented to person, place, and time. Psychiatric:         Behavior: Behavior normal.       ASSESSMENT and PLAN    Patient advised to take alcohol in moderation to avoid future cardiac and liver problems including arrhythmias, cardiomyopathy and congestive heart failure. HLD: Results for Susi Favre (MRN [id number]) as of 5/2/2022 13:58   Ref. Range 11/30/2020 07:18 9/1/2021 07:01   Triglyceride Latest Ref Range: <150 MG/ (H) 280 (H)   Cholesterol, total Latest Ref Range: <200 MG/ (H) 228 (H)   HDL Cholesterol Latest Ref Range: 40 - 60 MG/DL 57 62 (H)   CHOL/HDL Ratio Latest Ref Range: 0 - 5.0   4.4 3.7   VLDL, calculated Latest Units: MG/DL 36.6 56   LDL, calculated Latest Ref Range: 0 - 100 MG/.4 (H) 110 (H)       12/20 patient continues to drink heavy alcohol on a daily basis. Blood pressure is mildly elevated. Increase HCTZ and follow home chart. Echo has shown normal ejection fraction and normalization of the right-sided chamber size. Patient has cut down on ham intake and edema is resolved. Continue same medications. Labs as ordered. Detailed discussion regarding reduction of alcohol. He told me that he does not want to stop it but he will try reducing it.   He understands that alcohol will depress the heart muscle causing reduced function and irregular heartbeats as well along with liver disease. 6/18/2021 blood pressure is controlled. Patient has cut down alcohol significantly and now drinks only beer. He seemed inclined to reduce the beer now as well and he was encouraged to do that. Lipids are not controlled but will repeat a test before adjusting medications as he has cut down his alcohol significantly. Mediterranean diet guidelines printed. 12/17/2021 blood pressure is controlled. Lipids showed improving LDL but triglycerides are worse, likely secondary to alcohol abuse. Discussed in detail regarding reducing alcohol further. He will try. He is developing orthopnea and there is mild edema indicating early CHF. Check echo. 5/2/2022 echo has shown normal ejection fraction but moderate LVH and mild MR. Patient is in chronic diastolic CHF. Acute component of CHF seems to be improving his edema is less today. He has lost a few pounds. Blood pressure is controlled  Continues to drink alcohol excessively in the amount of about 4 beers a day. Detailed discussion regarding alcohol management. He will try. Lipids are improving but not at goal.  Hopefully he can follow the diet and continue the medications. Elevated triglycerides also secondary to alcohol abuse. Diagnoses and all orders for this visit:    1. Essential hypertension  -     METABOLIC PANEL, BASIC; Future  -     CBC W/O DIFF; Future  -     LIPID PANEL; Future  -     HEPATIC FUNCTION PANEL; Future    2. Alcohol abuse    3. Hypercholesteremia  -     LIPID PANEL; Future    4. Nonrheumatic mitral valve regurgitation        Pertinent laboratory and test data reviewed and discussed with patient. See patient instructions also for other medical advice given    There are no discontinued medications.     Follow-up and Dispositions    Return in about 1 year (around 11/14/2023), or if symptoms worsen or fail to improve, for with ekg. 11/14/2022 patient is controlled. He continues to drink heavy amounts of alcohol and understands the importance to reduce/stop as it is contributing to hypertension as well as hypertriglyceridemia. MR is mild and to be followed clinically  He has lost a few pounds and edema is almost resolved as also his symptoms of shortness of breath.

## 2022-11-14 NOTE — PATIENT INSTRUCTIONS
There are no discontinued medications. Alcohol Detoxification and Withdrawal: Care Instructions  Your Care Instructions     If you drink alcohol regularly and then suddenly stop, you may go through some physical and emotional problems while the alcohol clears out of your system. Clearing the alcohol from your body is called detoxification, or detox. Physical and emotional problems that may happen during detox are called withdrawal.  Symptoms of withdrawal can be scary and dangerous. Mild symptoms include nausea and vomiting, sweating, shakiness, and intense worry. Severe symptoms include being confused and irritable, feeling things on your body that are not there, seeing or hearing things that are not there, and trembling. You may even have seizures. If your symptoms become severe you must see a doctor. People who drink large amounts of alcohol should not try to detox at home. A person can die of severe alcohol withdrawal.  Symptoms of alcohol withdrawal may begin from 4 to 12 hours after you stop drinking. But they may not start for several days after the last drink. They can last a few days. It is hard to stop drinking. But when you have cleared the alcohol from your system, you will be able to start the next part of your life, free from the burden of being dependent. Follow-up care is a key part of your treatment and safety. Be sure to make and go to all appointments, and call your doctor if you are having problems. It's also a good idea to know your test results and keep a list of the medicines you take. How can you care for yourself at home? Before you stop drinking, talk to your doctor about how you plan to stop. Be sure to be completely honest with the doctor about how much you have been drinking. Your doctor will figure out whether you need to detox in a supervised medical center. Take your medicines exactly as prescribed.  Call your doctor if you think you are having a problem with your medicine. Make sure someone you trust is with you the whole time. Have friends and family members take turns staying with you until you are done with detox. Put a list of emergency numbers near the phone. This should include your doctor, the police, the nearest hospital and emergency room, and neighbors who can help if needed. Make sure all alcohol is removed from the house before you start. This includes beverages as well as medicines, rubbing alcohol, and certain flavorings like vanilla extract. Keep \"drinking buddies\" away during the time you are going through detox. Make your surroundings calm. Soft lights, soft music, and a comfortable place to sit or lie down can help make the process easier. Drink lots of fluids and eat snacks such as fruit, cheese and crackers, and pretzels. Foods high in carbohydrate may help reduce the craving for alcohol. Understand that detox is going to be hard. Keep in mind that the people watching over you during detox are there to help. Explain to them before you start that you may not act like yourself until detox is finished. Consider joining a support group such as Alcoholics Anonymous. Sharing your experiences with other people who face similar challenges may help you feel less overwhelmed. Where to get help 24 hours a day, 7 days a week   If you or someone you know talks about suicide, self-harm, a mental health crisis, a substance use crisis, or any other kind of emotional distress, get help right away. You can:  Call the Suicide and Crisis Lifeline at 65. Call 5-764-571-HPKM (9-799.895.5701). Text HOME to 386185 to access the Crisis Text Line. Consider saving these numbers in your phone. When should you call for help? Call 884 anytime you think you may need emergency care. For example, call if:    You feel you cannot stop from hurting yourself or someone else. You vomit many times and cannot stop. You vomit blood or what looks like coffee grounds. You have trouble breathing or are breathing very fast.     Your heart beats more than 120 times a minute and will not slow down. You have chest pain. You have a seizure. You see or feel things that are not there (hallucinate). If you are caring for someone who is going through detox, call if:    The person passes out (loses consciousness). The person sees or feels things that are not there and sees or hears the same things many times. The person is very agitated and will not calm down. The person becomes violent or threatens to be violent. The person has a seizure. Call your doctor now or seek immediate medical care if:    You have a high fever. You have severe belly pain. You are very shaky. Watch closely for changes in your health, and be sure to contact your doctor if:    You do not get better as expected. Where can you learn more? Go to http://www.[facility].com/  Enter D051 in the search box to learn more about \"Alcohol Detoxification and Withdrawal: Care Instructions. \"  Current as of: November 8, 2021               Content Version: 13.4  © 2006-2022 [employer]. Care instructions adapted under license by [employer] (which disclaims liability or warranty for this information). If you have questions about a medical condition or this instruction, always ask your healthcare professional. [address] any warranty or liability for your use of this information.

## 2022-11-14 NOTE — PROGRESS NOTES
1. Have you been to the ER, urgent care clinic since your last visit? Hospitalized since your last visit? Yes When: 10/2022 Where: [facility] Reason for visit: covid    2. Have you seen or consulted any other health care providers outside of the [address] [place] [address] since your last visit? Include any pap smears or colon screening. No     3. Since your last visit, have you had any of the following symptoms?      no    4. Have you had any blood work, X-rays or cardiac testing?     no    5. Where do you normally have your labs drawn? 6. Do you need any refills today?    no

## 2022-11-15 RX ORDER — ATORVASTATIN CALCIUM 10 MG/1
TABLET, FILM COATED ORAL
Qty: 90 TABLET | Refills: 3 | Status: SHIPPED | OUTPATIENT
Start: 2022-11-15

## 2022-12-05 DIAGNOSIS — E78.5 HYPERLIPIDEMIA, UNSPECIFIED HYPERLIPIDEMIA TYPE: ICD-10-CM

## 2022-12-05 DIAGNOSIS — I10 ESSENTIAL HYPERTENSION WITH GOAL BLOOD PRESSURE LESS THAN 130/80: ICD-10-CM

## 2022-12-05 RX ORDER — HYDROCHLOROTHIAZIDE 25 MG/1
25 TABLET ORAL DAILY
Qty: 90 TABLET | Refills: 3 | Status: SHIPPED | OUTPATIENT
Start: 2022-12-05

## 2022-12-05 RX ORDER — FENOFIBRATE 145 MG/1
145 TABLET, COATED ORAL DAILY
Qty: 90 TABLET | Refills: 3 | Status: SHIPPED | OUTPATIENT
Start: 2022-12-05

## 2022-12-05 NOTE — TELEPHONE ENCOUNTER
Requested Prescriptions     Pending Prescriptions Disp Refills    hydroCHLOROthiazide (HYDRODIURIL) 25 mg tablet 90 Tablet 3     Sig: Take 1 Tablet by mouth daily. fenofibrate nanocrystallized (TRICOR) 145 mg tablet 90 Tablet 3     Sig: Take 1 Tablet by mouth daily.

## 2022-12-15 DIAGNOSIS — I10 ESSENTIAL HYPERTENSION: ICD-10-CM

## 2022-12-15 RX ORDER — METOPROLOL TARTRATE 25 MG/1
25 TABLET, FILM COATED ORAL 2 TIMES DAILY
Qty: 180 TABLET | Refills: 2 | Status: SHIPPED
Start: 2022-12-15 | End: 2022-12-16 | Stop reason: SDUPTHER

## 2023-01-20 ENCOUNTER — HOSPITAL ENCOUNTER (OUTPATIENT)
Dept: LAB | Age: 53
End: 2023-01-20
Payer: COMMERCIAL

## 2023-01-20 DIAGNOSIS — I10 ESSENTIAL HYPERTENSION: ICD-10-CM

## 2023-01-20 DIAGNOSIS — E78.00 HYPERCHOLESTEREMIA: ICD-10-CM

## 2023-01-20 LAB
ALBUMIN SERPL-MCNC: 3.9 G/DL (ref 3.4–5)
ALBUMIN/GLOB SERPL: 1.1 (ref 0.8–1.7)
ALP SERPL-CCNC: 41 U/L (ref 45–117)
ALT SERPL-CCNC: 33 U/L (ref 16–61)
ANION GAP SERPL CALC-SCNC: 6 MMOL/L (ref 3–18)
AST SERPL-CCNC: 42 U/L (ref 10–38)
BILIRUB DIRECT SERPL-MCNC: 0.2 MG/DL (ref 0–0.2)
BILIRUB SERPL-MCNC: 0.5 MG/DL (ref 0.2–1)
BUN SERPL-MCNC: 12 MG/DL (ref 7–18)
BUN/CREAT SERPL: 14 (ref 12–20)
CALCIUM SERPL-MCNC: 9.3 MG/DL (ref 8.5–10.1)
CHLORIDE SERPL-SCNC: 103 MMOL/L (ref 100–111)
CHOLEST SERPL-MCNC: 179 MG/DL
CO2 SERPL-SCNC: 30 MMOL/L (ref 21–32)
CREAT SERPL-MCNC: 0.86 MG/DL (ref 0.6–1.3)
ERYTHROCYTE [DISTWIDTH] IN BLOOD BY AUTOMATED COUNT: 12.5 % (ref 11.6–14.5)
GLOBULIN SER CALC-MCNC: 3.7 G/DL (ref 2–4)
GLUCOSE SERPL-MCNC: 93 MG/DL (ref 74–99)
HCT VFR BLD AUTO: 38.4 % (ref 36–48)
HDLC SERPL-MCNC: 60 MG/DL (ref 40–60)
HDLC SERPL: 3 (ref 0–5)
HGB BLD-MCNC: 13.6 G/DL (ref 13–16)
LDLC SERPL CALC-MCNC: 100.6 MG/DL (ref 0–100)
LIPID PROFILE,FLP: ABNORMAL
MCH RBC QN AUTO: 32.5 PG (ref 24–34)
MCHC RBC AUTO-ENTMCNC: 35.4 G/DL (ref 31–37)
MCV RBC AUTO: 91.9 FL (ref 78–100)
NRBC # BLD: 0 K/UL (ref 0–0.01)
NRBC BLD-RTO: 0 PER 100 WBC
PLATELET # BLD AUTO: 250 K/UL (ref 135–420)
PMV BLD AUTO: 10.1 FL (ref 9.2–11.8)
POTASSIUM SERPL-SCNC: 3.8 MMOL/L (ref 3.5–5.5)
PROT SERPL-MCNC: 7.6 G/DL (ref 6.4–8.2)
RBC # BLD AUTO: 4.18 M/UL (ref 4.35–5.65)
SODIUM SERPL-SCNC: 139 MMOL/L (ref 136–145)
TRIGL SERPL-MCNC: 92 MG/DL (ref ?–150)
VLDLC SERPL CALC-MCNC: 18.4 MG/DL
WBC # BLD AUTO: 4.6 K/UL (ref 4.6–13.2)

## 2023-01-20 PROCEDURE — 80048 BASIC METABOLIC PNL TOTAL CA: CPT

## 2023-01-20 PROCEDURE — 36415 COLL VENOUS BLD VENIPUNCTURE: CPT

## 2023-01-20 PROCEDURE — 80061 LIPID PANEL: CPT

## 2023-01-20 PROCEDURE — 80076 HEPATIC FUNCTION PANEL: CPT

## 2023-01-20 PROCEDURE — 85027 COMPLETE CBC AUTOMATED: CPT

## 2023-06-26 RX ORDER — AMLODIPINE BESYLATE 10 MG/1
TABLET ORAL
Qty: 90 TABLET | Refills: 3 | Status: SHIPPED | OUTPATIENT
Start: 2023-06-26

## 2023-10-31 RX ORDER — ATORVASTATIN CALCIUM 10 MG/1
TABLET, FILM COATED ORAL
Qty: 90 TABLET | Refills: 3 | Status: SHIPPED | OUTPATIENT
Start: 2023-10-31

## 2023-11-13 ENCOUNTER — OFFICE VISIT (OUTPATIENT)
Age: 53
End: 2023-11-13
Payer: COMMERCIAL

## 2023-11-13 VITALS
SYSTOLIC BLOOD PRESSURE: 129 MMHG | DIASTOLIC BLOOD PRESSURE: 79 MMHG | BODY MASS INDEX: 30.01 KG/M2 | OXYGEN SATURATION: 97 % | HEIGHT: 68 IN | WEIGHT: 198 LBS | HEART RATE: 71 BPM

## 2023-11-13 DIAGNOSIS — E78.2 MIXED HYPERLIPIDEMIA: ICD-10-CM

## 2023-11-13 DIAGNOSIS — I34.0 NONRHEUMATIC MITRAL (VALVE) INSUFFICIENCY: ICD-10-CM

## 2023-11-13 DIAGNOSIS — F10.10 ALCOHOL ABUSE, UNCOMPLICATED: ICD-10-CM

## 2023-11-13 DIAGNOSIS — I10 ESSENTIAL (PRIMARY) HYPERTENSION: Primary | ICD-10-CM

## 2023-11-13 PROCEDURE — 3074F SYST BP LT 130 MM HG: CPT | Performed by: INTERNAL MEDICINE

## 2023-11-13 PROCEDURE — 3078F DIAST BP <80 MM HG: CPT | Performed by: INTERNAL MEDICINE

## 2023-11-13 PROCEDURE — 93000 ELECTROCARDIOGRAM COMPLETE: CPT | Performed by: INTERNAL MEDICINE

## 2023-11-13 PROCEDURE — 99214 OFFICE O/P EST MOD 30 MIN: CPT | Performed by: INTERNAL MEDICINE

## 2023-11-13 ASSESSMENT — ENCOUNTER SYMPTOMS
RESPIRATORY NEGATIVE: 1
GASTROINTESTINAL NEGATIVE: 1
EYES NEGATIVE: 1

## 2023-11-13 NOTE — PROGRESS NOTES
Room 6    Medications verbally reviewed. 1. Have you been to the ER, urgent care clinic since your last visit? Hospitalized since your last visit? No    2. Where do you normally have your labs drawn? No      3. Do you need any refills today? No      4. Which local pharmacy do you use? [facility]      5. Which mail order pharmacy do you use? None       6. Are you here for surgical clearance? No  who will be doing your procedure/surgery (care team)?    N/A
LABVLDL 36.6 11/30/2020       Pertinent laboratory and test data reviewed and discussed with patient. Patient advised to take alcohol in moderation to avoid future cardiac and liver problems including arrhythmias, cardiomyopathy and congestive heart failure. 12/20 patient continues to drink heavy alcohol on a daily basis. Blood pressure is mildly elevated. Increase HCTZ and follow home chart. Echo has shown normal ejection fraction and normalization of the right-sided chamber size. Patient has cut down on ham intake and edema is resolved. Continue same medications. Labs as ordered. Detailed discussion regarding reduction of alcohol. He told me that he does not want to stop it but he will try reducing it. He understands that alcohol will depress the heart muscle causing reduced function and irregular heartbeats as well along with liver disease. 6/18/2021 blood pressure is controlled. Patient has cut down alcohol significantly and now drinks only beer. He seemed inclined to reduce the beer now as well and he was encouraged to do that. Lipids are not controlled but will repeat a test before adjusting medications as he has cut down his alcohol significantly. Mediterranean diet guidelines printed. 12/17/2021 blood pressure is controlled. Lipids showed improving LDL but triglycerides are worse, likely secondary to alcohol abuse. Discussed in detail regarding reducing alcohol further. He will try. He is developing orthopnea and there is mild edema indicating early CHF. Check echo. 5/2/2022 echo has shown normal ejection fraction but moderate LVH and mild MR. Patient is in chronic diastolic CHF. Acute component of CHF seems to be improving his edema is less today. He has lost a few pounds. Blood pressure is controlled  Continues to drink alcohol excessively in the amount of about 4 beers a day. Detailed discussion regarding alcohol management. He will try.   Lipids are improving

## 2023-12-05 RX ORDER — HYDROCHLOROTHIAZIDE 25 MG/1
25 TABLET ORAL DAILY
Qty: 90 TABLET | Refills: 1 | Status: SHIPPED | OUTPATIENT
Start: 2023-12-05

## 2023-12-05 RX ORDER — FENOFIBRATE 145 MG/1
145 TABLET, COATED ORAL DAILY
Qty: 90 TABLET | Refills: 1 | Status: SHIPPED | OUTPATIENT
Start: 2023-12-05

## 2024-05-31 RX ORDER — FENOFIBRATE 145 MG/1
145 TABLET, COATED ORAL DAILY
Qty: 90 TABLET | Refills: 1 | Status: SHIPPED | OUTPATIENT
Start: 2024-05-31

## 2024-06-03 RX ORDER — HYDROCHLOROTHIAZIDE 25 MG/1
25 TABLET ORAL DAILY
Qty: 90 TABLET | Refills: 1 | Status: SHIPPED | OUTPATIENT
Start: 2024-06-03

## 2024-06-17 ENCOUNTER — TELEPHONE (OUTPATIENT)
Age: 54
End: 2024-06-17

## 2024-06-17 RX ORDER — AMLODIPINE BESYLATE 10 MG/1
10 TABLET ORAL DAILY
Qty: 90 TABLET | Refills: 3 | Status: SHIPPED | OUTPATIENT
Start: 2024-06-17

## 2024-10-10 RX ORDER — ATORVASTATIN CALCIUM 10 MG/1
10 TABLET, FILM COATED ORAL DAILY
Qty: 90 TABLET | Refills: 3 | Status: SHIPPED | OUTPATIENT
Start: 2024-10-10

## 2024-11-29 ENCOUNTER — TRANSCRIBE ORDERS (OUTPATIENT)
Facility: HOSPITAL | Age: 54
End: 2024-11-29

## 2024-11-29 ENCOUNTER — HOSPITAL ENCOUNTER (OUTPATIENT)
Facility: HOSPITAL | Age: 54
Discharge: HOME | End: 2024-12-02
Payer: COMMERCIAL

## 2024-11-29 DIAGNOSIS — M25.561 RIGHT KNEE PAIN, UNSPECIFIED CHRONICITY: ICD-10-CM

## 2024-11-29 DIAGNOSIS — M25.561 RIGHT KNEE PAIN, UNSPECIFIED CHRONICITY: Primary | ICD-10-CM

## 2024-11-29 PROCEDURE — 73562 X-RAY EXAM OF KNEE 3: CPT

## 2024-12-02 RX ORDER — FENOFIBRATE 145 MG/1
145 TABLET, COATED ORAL DAILY
Qty: 90 TABLET | Refills: 3 | Status: SHIPPED | OUTPATIENT
Start: 2024-12-02

## 2024-12-02 RX ORDER — HYDROCHLOROTHIAZIDE 25 MG/1
25 TABLET ORAL DAILY
Qty: 90 TABLET | Refills: 3 | Status: SHIPPED | OUTPATIENT
Start: 2024-12-02

## 2024-12-02 NOTE — TELEPHONE ENCOUNTER
Requested Prescriptions     Pending Prescriptions Disp Refills    fenofibrate (TRICOR) 145 MG tablet 90 tablet 3     Sig: Take 1 tablet by mouth daily    hydroCHLOROthiazide (HYDRODIURIL) 25 MG tablet 90 tablet 3     Sig: Take 1 tablet by mouth daily

## 2024-12-05 RX ORDER — METOPROLOL TARTRATE 25 MG/1
25 TABLET, FILM COATED ORAL 2 TIMES DAILY
Qty: 180 TABLET | Refills: 1 | Status: SHIPPED | OUTPATIENT
Start: 2024-12-05

## 2025-01-21 ENCOUNTER — OFFICE VISIT (OUTPATIENT)
Age: 55
End: 2025-01-21
Payer: COMMERCIAL

## 2025-01-21 VITALS
SYSTOLIC BLOOD PRESSURE: 147 MMHG | BODY MASS INDEX: 30.16 KG/M2 | HEIGHT: 68 IN | WEIGHT: 199 LBS | DIASTOLIC BLOOD PRESSURE: 81 MMHG | HEART RATE: 64 BPM

## 2025-01-21 DIAGNOSIS — F10.10 ALCOHOL ABUSE, UNCOMPLICATED: ICD-10-CM

## 2025-01-21 DIAGNOSIS — E78.2 MIXED HYPERLIPIDEMIA: ICD-10-CM

## 2025-01-21 DIAGNOSIS — I34.0 NONRHEUMATIC MITRAL (VALVE) INSUFFICIENCY: ICD-10-CM

## 2025-01-21 DIAGNOSIS — I45.3 TRIFASCICULAR BLOCK: ICD-10-CM

## 2025-01-21 DIAGNOSIS — I10 ESSENTIAL (PRIMARY) HYPERTENSION: Primary | ICD-10-CM

## 2025-01-21 PROCEDURE — 99214 OFFICE O/P EST MOD 30 MIN: CPT | Performed by: INTERNAL MEDICINE

## 2025-01-21 PROCEDURE — 93000 ELECTROCARDIOGRAM COMPLETE: CPT | Performed by: INTERNAL MEDICINE

## 2025-01-21 PROCEDURE — 3079F DIAST BP 80-89 MM HG: CPT | Performed by: INTERNAL MEDICINE

## 2025-01-21 PROCEDURE — 3077F SYST BP >= 140 MM HG: CPT | Performed by: INTERNAL MEDICINE

## 2025-01-21 RX ORDER — DOXAZOSIN 1 MG/1
1 TABLET ORAL DAILY
Qty: 30 TABLET | Refills: 3 | Status: SHIPPED | OUTPATIENT
Start: 2025-01-21

## 2025-01-21 ASSESSMENT — ENCOUNTER SYMPTOMS
EYES NEGATIVE: 1
RESPIRATORY NEGATIVE: 1
GASTROINTESTINAL NEGATIVE: 1

## 2025-01-21 NOTE — PROGRESS NOTES
Ravi Arshad is a 54 y.o. year old male.    Patient denies significant chest pain, SOB, palpitations, edema, dizziness  Follow-up of hypertension, hyperlipidemia, alcohol abuse      5/17 d/percy fenofibrate due to rectal odor without incontinence  11/16 thinks that he is getting mild bronchitis for last few days- improving  10/18 - Doing well, no complaints at this time. Denies chest pain, sob, orthopnea, edema, palpitations or dizziness.  12/21 no daytime shortness of breath but feels wheezy at night when he is laying down.  Feels better when he sits up  11/13/2023  No significant chest pain, shortness of breath, edema, dizziness, palpitations or loss of consciousness.  Had gone without alcohol for a month but restarted drinking and now takes about 5 drinks a night.  1/21/2025 no chest pain dyspnea edema dizziness palpitations.  Continues to drink about 5 drinks a night.            Review of Systems   Constitutional: Negative.    HENT: Negative.     Eyes: Negative.    Respiratory: Negative.     Cardiovascular: Negative.    Gastrointestinal: Negative.    Endocrine: Negative.    Genitourinary: Negative.    Musculoskeletal: Negative.    Neurological: Negative.    Psychiatric/Behavioral: Negative.     All other systems reviewed and are negative.        Physical Exam  Vitals and nursing note reviewed.   Constitutional:       Appearance: Normal appearance.   HENT:      Head: Normocephalic and atraumatic.      Nose: Nose normal.   Eyes:      Conjunctiva/sclera: Conjunctivae normal.   Cardiovascular:      Rate and Rhythm: Normal rate and regular rhythm.      Pulses: Normal pulses.      Heart sounds: Normal heart sounds.   Pulmonary:      Effort: Pulmonary effort is normal.      Breath sounds: Normal breath sounds.   Abdominal:      General: Bowel sounds are normal.      Palpations: Abdomen is soft.   Musculoskeletal:         General: Normal range of motion.      Right lower leg: No edema.      Left lower leg: No edema.

## 2025-01-21 NOTE — PROGRESS NOTES
1. Have you been to the ER, urgent care clinic since your last visit?  Hospitalized since your last visit?     no      2.  Where do you normally have your labs drawn?       3. Do you need any refills today?   no    4. Which local pharmacy do you use (enter pharmacy)?  [facility]     5. Which mail order pharmacy do you use (enter pharmacy)?        6. Are you here for surgical clearance and if so who will be doing your     procedure/surgery (care team)?   no

## 2025-05-20 DIAGNOSIS — I10 ESSENTIAL (PRIMARY) HYPERTENSION: ICD-10-CM

## 2025-05-21 DIAGNOSIS — I10 ESSENTIAL (PRIMARY) HYPERTENSION: ICD-10-CM

## 2025-05-21 RX ORDER — DOXAZOSIN 1 MG/1
1 TABLET ORAL DAILY
Qty: 90 TABLET | Refills: 1 | Status: SHIPPED | OUTPATIENT
Start: 2025-05-21 | End: 2025-05-21 | Stop reason: SDUPTHER

## 2025-05-21 RX ORDER — DOXAZOSIN 1 MG/1
1 TABLET ORAL DAILY
Qty: 90 TABLET | Refills: 3 | Status: SHIPPED | OUTPATIENT
Start: 2025-05-21